# Patient Record
Sex: MALE | Race: WHITE | Employment: UNEMPLOYED | URBAN - NONMETROPOLITAN AREA
[De-identification: names, ages, dates, MRNs, and addresses within clinical notes are randomized per-mention and may not be internally consistent; named-entity substitution may affect disease eponyms.]

---

## 2023-12-27 ENCOUNTER — HOSPITAL ENCOUNTER (EMERGENCY)
Age: 48
Discharge: HOME | End: 2023-12-27

## 2023-12-27 VITALS
HEART RATE: 78 BPM | OXYGEN SATURATION: 100 % | TEMPERATURE: 98 F | RESPIRATION RATE: 21 BRPM | SYSTOLIC BLOOD PRESSURE: 117 MMHG | DIASTOLIC BLOOD PRESSURE: 74 MMHG

## 2023-12-27 DIAGNOSIS — H10.9 CONJUNCTIVITIS OF BOTH EYES, UNSPECIFIED CONJUNCTIVITIS TYPE: Primary | ICD-10-CM

## 2023-12-27 PROCEDURE — 99283 EMERGENCY DEPT VISIT LOW MDM: CPT

## 2023-12-27 RX ORDER — POLYMYXIN B SULFATE AND TRIMETHOPRIM 1; 10000 MG/ML; [USP'U]/ML
1 SOLUTION OPHTHALMIC EVERY 4 HOURS
Qty: 3 ML | Refills: 0 | Status: SHIPPED | OUTPATIENT
Start: 2023-12-27 | End: 2024-01-06

## 2023-12-27 RX ORDER — POLYMYXIN B SULFATE AND TRIMETHOPRIM 1; 10000 MG/ML; [USP'U]/ML
1 SOLUTION OPHTHALMIC
Status: DISCONTINUED | OUTPATIENT
Start: 2023-12-27 | End: 2023-12-27 | Stop reason: HOSPADM

## 2023-12-27 RX ORDER — PROPARACAINE HYDROCHLORIDE 5 MG/ML
1 SOLUTION/ DROPS OPHTHALMIC ONCE
Status: DISCONTINUED | OUTPATIENT
Start: 2023-12-27 | End: 2023-12-27 | Stop reason: HOSPADM

## 2023-12-27 NOTE — ED TRIAGE NOTES
Pt arrives to ED from home with c/o left eye pain, possible hemorrhage   Pt states he went to [facility] earlier today who told him he needed to come to ED to be seen  Pt states it started 12/24, with what he thought was pink eye

## 2024-03-13 ENCOUNTER — APPOINTMENT (OUTPATIENT)
Dept: CT IMAGING | Age: 49
DRG: 291 | End: 2024-03-13
Payer: MEDICAID

## 2024-03-13 ENCOUNTER — HOSPITAL ENCOUNTER (INPATIENT)
Age: 49
LOS: 4 days | Discharge: HOME | DRG: 291 | End: 2024-03-17
Attending: EMERGENCY MEDICINE | Admitting: HOSPITALIST
Payer: MEDICAID

## 2024-03-13 ENCOUNTER — APPOINTMENT (OUTPATIENT)
Dept: GENERAL RADIOLOGY | Age: 49
DRG: 291 | End: 2024-03-13
Payer: MEDICAID

## 2024-03-13 DIAGNOSIS — I50.811 ACUTE RIGHT-SIDED HEART FAILURE (HCC): Primary | ICD-10-CM

## 2024-03-13 DIAGNOSIS — R60.0 EDEMA OF RIGHT LOWER LEG: ICD-10-CM

## 2024-03-13 DIAGNOSIS — I26.09 ACUTE PULMONARY EMBOLISM WITH ACUTE COR PULMONALE, UNSPECIFIED PULMONARY EMBOLISM TYPE (HCC): ICD-10-CM

## 2024-03-13 PROBLEM — I50.9 ACUTE ON CHRONIC CONGESTIVE HEART FAILURE (HCC): Status: ACTIVE | Noted: 2024-03-13

## 2024-03-13 PROBLEM — R60.1 ANASARCA: Status: ACTIVE | Noted: 2024-03-13

## 2024-03-13 LAB
ALBUMIN SERPL BCG-MCNC: 4.2 G/DL (ref 3.5–5.1)
ALP SERPL-CCNC: 72 U/L (ref 38–126)
ALT SERPL W/O P-5'-P-CCNC: 22 U/L (ref 11–66)
ANION GAP SERPL CALC-SCNC: 10 MEQ/L (ref 8–16)
APTT PPP: 33.3 SECONDS (ref 22–38)
ARTERIAL PATENCY WRIST A: POSITIVE
AST SERPL-CCNC: 34 U/L (ref 5–40)
BACTERIA: NORMAL
BASE EXCESS BLDA CALC-SCNC: 8.2 MMOL/L (ref -2.5–2.5)
BDY SITE: ABNORMAL
BILIRUB SERPL-MCNC: 1 MG/DL (ref 0.3–1.2)
BILIRUB UR QL STRIP: NEGATIVE
BUN SERPL-MCNC: 10 MG/DL (ref 7–22)
CALCIUM SERPL-MCNC: 9.5 MG/DL (ref 8.5–10.5)
CASTS #/AREA URNS LPF: NORMAL /LPF
CASTS #/AREA URNS LPF: NORMAL /LPF
CHARACTER UR: CLEAR
CHARCOAL URNS QL MICRO: NORMAL
CHLORIDE SERPL-SCNC: 90 MEQ/L (ref 98–111)
CO2 SERPL-SCNC: 30 MEQ/L (ref 23–33)
COLLECTED BY:: ABNORMAL
COLOR UR: YELLOW
CREAT SERPL-MCNC: 0.7 MG/DL (ref 0.4–1.2)
CRYSTALS URNS QL MICRO: NORMAL
DEPRECATED RDW RBC AUTO: 51.2 FL (ref 35–45)
DEVICE: ABNORMAL
EKG ATRIAL RATE: 81 BPM
EKG P AXIS: 81 DEGREES
EKG P-R INTERVAL: 124 MS
EKG Q-T INTERVAL: 364 MS
EKG QRS DURATION: 76 MS
EKG QTC CALCULATION (BAZETT): 422 MS
EKG R AXIS: 169 DEGREES
EKG T AXIS: 68 DEGREES
EKG VENTRICULAR RATE: 81 BPM
EPITHELIAL CELLS, UA: NORMAL /HPF
ERYTHROCYTE [DISTWIDTH] IN BLOOD BY AUTOMATED COUNT: 14.1 % (ref 11.5–14.5)
GFR SERPL CREATININE-BSD FRML MDRD: > 60 ML/MIN/1.73M2
GLUCOSE SERPL-MCNC: 75 MG/DL (ref 70–108)
GLUCOSE UR QL STRIP.AUTO: NEGATIVE MG/DL
HCO3 BLDA-SCNC: 36 MMOL/L (ref 23–28)
HCT VFR BLD AUTO: 58.1 % (ref 42–52)
HEPARIN UNFRACTIONATED: < 0.04 U/ML (ref 0.3–0.7)
HGB BLD-MCNC: 19.2 GM/DL (ref 14–18)
HGB UR QL STRIP.AUTO: NEGATIVE
INR PPP: 0.88 (ref 0.85–1.13)
KETONES UR QL STRIP.AUTO: NEGATIVE
LEUKOCYTE ESTERASE UR QL STRIP.AUTO: NEGATIVE
MCH RBC QN AUTO: 32.3 PG (ref 26–33)
MCHC RBC AUTO-ENTMCNC: 33 GM/DL (ref 32.2–35.5)
MCV RBC AUTO: 97.6 FL (ref 80–94)
NITRITE UR QL STRIP.AUTO: NEGATIVE
NT-PROBNP SERPL IA-MCNC: 7818 PG/ML (ref 0–124)
OSMOLALITY SERPL CALC.SUM OF ELEC: 258.5 MOSMOL/KG (ref 275–300)
OSMOLALITY UR: 216 MOSMOL/KG (ref 250–750)
PCO2 BLDA: 55 MMHG (ref 35–45)
PH BLDA: 7.42 [PH] (ref 7.35–7.45)
PH UR STRIP.AUTO: 7 [PH] (ref 5–9)
PLATELET # BLD AUTO: 279 THOU/MM3 (ref 130–400)
PMV BLD AUTO: 9.1 FL (ref 9.4–12.4)
PO2 BLDA: 50 MMHG (ref 71–104)
POTASSIUM SERPL-SCNC: 5 MEQ/L (ref 3.5–5.2)
PROT SERPL-MCNC: 6.3 G/DL (ref 6.1–8)
PROT UR STRIP.AUTO-MCNC: NEGATIVE MG/DL
RBC # BLD AUTO: 5.95 MILL/MM3 (ref 4.7–6.1)
RBC #/AREA URNS HPF: NORMAL /HPF
RENAL EPI CELLS #/AREA URNS HPF: NORMAL /[HPF]
SAO2 % BLDA: 85 %
SODIUM SERPL-SCNC: 130 MEQ/L (ref 135–145)
SODIUM UR-SCNC: 90 MEQ/L
SPECIFIC GRAVITY UA: 1 (ref 1–1.03)
T4 FREE SERPL-MCNC: 1.31 NG/DL (ref 0.93–1.68)
TROPONIN, HIGH SENSITIVITY: 22 NG/L (ref 0–12)
TSH SERPL DL<=0.005 MIU/L-ACNC: 6.2 UIU/ML (ref 0.4–4.2)
UROBILINOGEN, URINE: 0.2 EU/DL (ref 0–1)
WBC # BLD AUTO: 8 THOU/MM3 (ref 4.8–10.8)
WBC #/AREA URNS HPF: NORMAL /HPF
YEAST LIKE FUNGI URNS QL MICRO: NORMAL

## 2024-03-13 PROCEDURE — 82803 BLOOD GASES ANY COMBINATION: CPT

## 2024-03-13 PROCEDURE — 94640 AIRWAY INHALATION TREATMENT: CPT

## 2024-03-13 PROCEDURE — 84484 ASSAY OF TROPONIN QUANT: CPT

## 2024-03-13 PROCEDURE — 36415 COLL VENOUS BLD VENIPUNCTURE: CPT

## 2024-03-13 PROCEDURE — 85610 PROTHROMBIN TIME: CPT

## 2024-03-13 PROCEDURE — 6360000004 HC RX CONTRAST MEDICATION: Performed by: EMERGENCY MEDICINE

## 2024-03-13 PROCEDURE — 74177 CT ABD & PELVIS W/CONTRAST: CPT

## 2024-03-13 PROCEDURE — 71275 CT ANGIOGRAPHY CHEST: CPT

## 2024-03-13 PROCEDURE — 80053 COMPREHEN METABOLIC PANEL: CPT

## 2024-03-13 PROCEDURE — 6360000002 HC RX W HCPCS

## 2024-03-13 PROCEDURE — 85027 COMPLETE CBC AUTOMATED: CPT

## 2024-03-13 PROCEDURE — 84439 ASSAY OF FREE THYROXINE: CPT

## 2024-03-13 PROCEDURE — 85730 THROMBOPLASTIN TIME PARTIAL: CPT

## 2024-03-13 PROCEDURE — 83880 ASSAY OF NATRIURETIC PEPTIDE: CPT

## 2024-03-13 PROCEDURE — 2580000003 HC RX 258: Performed by: STUDENT IN AN ORGANIZED HEALTH CARE EDUCATION/TRAINING PROGRAM

## 2024-03-13 PROCEDURE — 6370000000 HC RX 637 (ALT 250 FOR IP)

## 2024-03-13 PROCEDURE — 99285 EMERGENCY DEPT VISIT HI MDM: CPT

## 2024-03-13 PROCEDURE — 93010 ELECTROCARDIOGRAM REPORT: CPT | Performed by: INTERNAL MEDICINE

## 2024-03-13 PROCEDURE — 2580000003 HC RX 258

## 2024-03-13 PROCEDURE — 36600 WITHDRAWAL OF ARTERIAL BLOOD: CPT

## 2024-03-13 PROCEDURE — 99223 1ST HOSP IP/OBS HIGH 75: CPT | Performed by: HOSPITALIST

## 2024-03-13 PROCEDURE — 93005 ELECTROCARDIOGRAM TRACING: CPT | Performed by: EMERGENCY MEDICINE

## 2024-03-13 PROCEDURE — 6360000002 HC RX W HCPCS: Performed by: EMERGENCY MEDICINE

## 2024-03-13 PROCEDURE — 83935 ASSAY OF URINE OSMOLALITY: CPT

## 2024-03-13 PROCEDURE — 96374 THER/PROPH/DIAG INJ IV PUSH: CPT

## 2024-03-13 PROCEDURE — 81001 URINALYSIS AUTO W/SCOPE: CPT

## 2024-03-13 PROCEDURE — 84300 ASSAY OF URINE SODIUM: CPT

## 2024-03-13 PROCEDURE — 96375 TX/PRO/DX INJ NEW DRUG ADDON: CPT

## 2024-03-13 PROCEDURE — 71045 X-RAY EXAM CHEST 1 VIEW: CPT

## 2024-03-13 PROCEDURE — 84443 ASSAY THYROID STIM HORMONE: CPT

## 2024-03-13 PROCEDURE — 85520 HEPARIN ASSAY: CPT

## 2024-03-13 PROCEDURE — 1200000003 HC TELEMETRY R&B

## 2024-03-13 RX ORDER — HEPARIN SODIUM 1000 [USP'U]/ML
30 INJECTION, SOLUTION INTRAVENOUS; SUBCUTANEOUS PRN
Status: DISCONTINUED | OUTPATIENT
Start: 2024-03-13 | End: 2024-03-13 | Stop reason: DRUGHIGH

## 2024-03-13 RX ORDER — POTASSIUM CHLORIDE 7.45 MG/ML
10 INJECTION INTRAVENOUS PRN
Status: DISCONTINUED | OUTPATIENT
Start: 2024-03-13 | End: 2024-03-17 | Stop reason: HOSPADM

## 2024-03-13 RX ORDER — ONDANSETRON 4 MG/1
4 TABLET, ORALLY DISINTEGRATING ORAL EVERY 8 HOURS PRN
Status: DISCONTINUED | OUTPATIENT
Start: 2024-03-13 | End: 2024-03-17 | Stop reason: HOSPADM

## 2024-03-13 RX ORDER — HEPARIN SODIUM 10000 [USP'U]/100ML
5-30 INJECTION, SOLUTION INTRAVENOUS CONTINUOUS
Status: DISCONTINUED | OUTPATIENT
Start: 2024-03-13 | End: 2024-03-13 | Stop reason: DRUGHIGH

## 2024-03-13 RX ORDER — SODIUM CHLORIDE 0.9 % (FLUSH) 0.9 %
10 SYRINGE (ML) INJECTION PRN
Status: DISCONTINUED | OUTPATIENT
Start: 2024-03-13 | End: 2024-03-17 | Stop reason: HOSPADM

## 2024-03-13 RX ORDER — ONDANSETRON 2 MG/ML
4 INJECTION INTRAMUSCULAR; INTRAVENOUS EVERY 6 HOURS PRN
Status: DISCONTINUED | OUTPATIENT
Start: 2024-03-13 | End: 2024-03-17 | Stop reason: HOSPADM

## 2024-03-13 RX ORDER — ACETAMINOPHEN 325 MG/1
650 TABLET ORAL EVERY 6 HOURS PRN
Status: DISCONTINUED | OUTPATIENT
Start: 2024-03-13 | End: 2024-03-17 | Stop reason: HOSPADM

## 2024-03-13 RX ORDER — HEPARIN SODIUM 1000 [USP'U]/ML
40 INJECTION, SOLUTION INTRAVENOUS; SUBCUTANEOUS PRN
Status: DISCONTINUED | OUTPATIENT
Start: 2024-03-13 | End: 2024-03-13 | Stop reason: ALTCHOICE

## 2024-03-13 RX ORDER — POLYETHYLENE GLYCOL 3350 17 G/17G
17 POWDER, FOR SOLUTION ORAL DAILY PRN
Status: DISCONTINUED | OUTPATIENT
Start: 2024-03-13 | End: 2024-03-17 | Stop reason: HOSPADM

## 2024-03-13 RX ORDER — DEXTROSE MONOHYDRATE 100 MG/ML
INJECTION, SOLUTION INTRAVENOUS CONTINUOUS PRN
Status: DISCONTINUED | OUTPATIENT
Start: 2024-03-13 | End: 2024-03-17 | Stop reason: HOSPADM

## 2024-03-13 RX ORDER — CALCIUM GLUCONATE 20 MG/ML
2000 INJECTION, SOLUTION INTRAVENOUS PRN
Status: DISCONTINUED | OUTPATIENT
Start: 2024-03-13 | End: 2024-03-17 | Stop reason: HOSPADM

## 2024-03-13 RX ORDER — NICOTINE 21 MG/24HR
1 PATCH, TRANSDERMAL 24 HOURS TRANSDERMAL DAILY
Status: DISCONTINUED | OUTPATIENT
Start: 2024-03-13 | End: 2024-03-17 | Stop reason: HOSPADM

## 2024-03-13 RX ORDER — MAGNESIUM SULFATE IN WATER 40 MG/ML
2000 INJECTION, SOLUTION INTRAVENOUS PRN
Status: DISCONTINUED | OUTPATIENT
Start: 2024-03-13 | End: 2024-03-17 | Stop reason: HOSPADM

## 2024-03-13 RX ORDER — SODIUM CHLORIDE 9 MG/ML
INJECTION, SOLUTION INTRAVENOUS PRN
Status: DISCONTINUED | OUTPATIENT
Start: 2024-03-13 | End: 2024-03-17 | Stop reason: HOSPADM

## 2024-03-13 RX ORDER — BUMETANIDE 0.25 MG/ML
1 INJECTION INTRAMUSCULAR; INTRAVENOUS 2 TIMES DAILY
Status: DISCONTINUED | OUTPATIENT
Start: 2024-03-14 | End: 2024-03-17 | Stop reason: HOSPADM

## 2024-03-13 RX ORDER — HEPARIN SODIUM 1000 [USP'U]/ML
80 INJECTION, SOLUTION INTRAVENOUS; SUBCUTANEOUS PRN
Status: DISCONTINUED | OUTPATIENT
Start: 2024-03-13 | End: 2024-03-13 | Stop reason: ALTCHOICE

## 2024-03-13 RX ORDER — ACETAMINOPHEN 650 MG/1
650 SUPPOSITORY RECTAL EVERY 6 HOURS PRN
Status: DISCONTINUED | OUTPATIENT
Start: 2024-03-13 | End: 2024-03-17 | Stop reason: HOSPADM

## 2024-03-13 RX ORDER — POTASSIUM CHLORIDE 20 MEQ/1
40 TABLET, EXTENDED RELEASE ORAL PRN
Status: DISCONTINUED | OUTPATIENT
Start: 2024-03-13 | End: 2024-03-17 | Stop reason: HOSPADM

## 2024-03-13 RX ORDER — HEPARIN SODIUM 1000 [USP'U]/ML
60 INJECTION, SOLUTION INTRAVENOUS; SUBCUTANEOUS PRN
Status: DISCONTINUED | OUTPATIENT
Start: 2024-03-13 | End: 2024-03-13 | Stop reason: DRUGHIGH

## 2024-03-13 RX ORDER — HEPARIN SODIUM 1000 [USP'U]/ML
60 INJECTION, SOLUTION INTRAVENOUS; SUBCUTANEOUS ONCE
Status: DISCONTINUED | OUTPATIENT
Start: 2024-03-13 | End: 2024-03-13 | Stop reason: DRUGHIGH

## 2024-03-13 RX ORDER — IPRATROPIUM BROMIDE AND ALBUTEROL SULFATE 2.5; .5 MG/3ML; MG/3ML
1 SOLUTION RESPIRATORY (INHALATION) ONCE
Status: COMPLETED | OUTPATIENT
Start: 2024-03-13 | End: 2024-03-13

## 2024-03-13 RX ORDER — FUROSEMIDE 10 MG/ML
40 INJECTION INTRAMUSCULAR; INTRAVENOUS ONCE
Status: COMPLETED | OUTPATIENT
Start: 2024-03-13 | End: 2024-03-13

## 2024-03-13 RX ORDER — HEPARIN SODIUM 10000 [USP'U]/100ML
5-30 INJECTION, SOLUTION INTRAVENOUS CONTINUOUS
Status: DISCONTINUED | OUTPATIENT
Start: 2024-03-13 | End: 2024-03-13

## 2024-03-13 RX ORDER — ALBUTEROL SULFATE 2.5 MG/3ML
2.5 SOLUTION RESPIRATORY (INHALATION) EVERY 6 HOURS PRN
Status: DISCONTINUED | OUTPATIENT
Start: 2024-03-13 | End: 2024-03-14

## 2024-03-13 RX ORDER — SODIUM CHLORIDE 0.9 % (FLUSH) 0.9 %
5-40 SYRINGE (ML) INJECTION EVERY 12 HOURS SCHEDULED
Status: DISCONTINUED | OUTPATIENT
Start: 2024-03-13 | End: 2024-03-17 | Stop reason: HOSPADM

## 2024-03-13 RX ORDER — HEPARIN SODIUM 1000 [USP'U]/ML
80 INJECTION, SOLUTION INTRAVENOUS; SUBCUTANEOUS ONCE
Status: COMPLETED | OUTPATIENT
Start: 2024-03-13 | End: 2024-03-13

## 2024-03-13 RX ORDER — IBUPROFEN 600 MG/1
1 TABLET ORAL PRN
Status: DISCONTINUED | OUTPATIENT
Start: 2024-03-13 | End: 2024-03-17 | Stop reason: HOSPADM

## 2024-03-13 RX ORDER — ASPIRIN 81 MG/1
81 TABLET, CHEWABLE ORAL DAILY
COMMUNITY

## 2024-03-13 RX ADMIN — IPRATROPIUM BROMIDE AND ALBUTEROL SULFATE 1 DOSE: .5; 3 SOLUTION RESPIRATORY (INHALATION) at 12:36

## 2024-03-13 RX ADMIN — HEPARIN SODIUM 4720 UNITS: 1000 INJECTION INTRAVENOUS; SUBCUTANEOUS at 13:39

## 2024-03-13 RX ADMIN — IOPAMIDOL 80 ML: 755 INJECTION, SOLUTION INTRAVENOUS at 12:11

## 2024-03-13 RX ADMIN — SODIUM CHLORIDE, PRESERVATIVE FREE 10 ML: 5 INJECTION INTRAVENOUS at 22:58

## 2024-03-13 RX ADMIN — WATER 125 MG: 1 INJECTION INTRAMUSCULAR; INTRAVENOUS; SUBCUTANEOUS at 12:27

## 2024-03-13 RX ADMIN — HEPARIN SODIUM 18 UNITS/KG/HR: 10000 INJECTION, SOLUTION INTRAVENOUS at 13:39

## 2024-03-13 RX ADMIN — FUROSEMIDE 40 MG: 10 INJECTION, SOLUTION INTRAMUSCULAR; INTRAVENOUS at 11:18

## 2024-03-13 ASSESSMENT — PAIN DESCRIPTION - DESCRIPTORS: DESCRIPTORS: TIGHTNESS

## 2024-03-13 ASSESSMENT — PAIN SCALES - GENERAL
PAINLEVEL_OUTOF10: 3

## 2024-03-13 ASSESSMENT — PAIN DESCRIPTION - LOCATION: LOCATION: ABDOMEN

## 2024-03-13 ASSESSMENT — PAIN - FUNCTIONAL ASSESSMENT
PAIN_FUNCTIONAL_ASSESSMENT: NONE - DENIES PAIN
PAIN_FUNCTIONAL_ASSESSMENT: 0-10
PAIN_FUNCTIONAL_ASSESSMENT: NONE - DENIES PAIN

## 2024-03-13 ASSESSMENT — PAIN DESCRIPTION - PAIN TYPE: TYPE: ACUTE PAIN

## 2024-03-13 NOTE — ED PROVIDER NOTES
PATIENT NAME: Alex Martell  MRN: [id number]  : 1975  MCRAE: 3/13/2024    I performed a history and physical examination of the patient and discussed management with the Resident. I reviewed the Resident's note and agree with the documented findings and plan of care. Any areas of disagreement are noted on the chart. I was personally present for the key portions of any procedures and have documented in the chart those procedures where I was not present during the key portions. I have reviewed the emergency nurses triage note and agree with the chief complaint, past medical history, past surgical history, allergies, medications, social and family history as documented unless otherwise noted below.    MEDICAL DEDISION MAKING (MDM)     Alex Martell is a 48 y.o. male who presents to Emergency Department with Leg Swelling, Abdominal Pain, and Shortness of Breath     B/L LE edema since 3/9/2024. He has increasing MCRAE with wet cough. No orthopnea.   PMH of asthma, pneumonia and COPD.     Exam: AVSS. Nontoxic appearing. Heart: RRR, S1 and S2. Lungs with wheezing and crackles. Soft abdomen w/o tenderness. Neurologically intact. No skin rashes. 3+ pitting edema.   POCUS echo shows right heart strain with flakes in IVC.    A subsequent CTA chest shows right heart strain and a small left lower lobe PE. Heparin drip was started. Admission is warranted. Discussed with and admitted by hospital service. Hemodynamically stable in ED    CRITICAL CARE: There was a high probability of clinically significant/life threatening deterioration in this patient's condition of acute PE with R heart strain which required my urgent intervention.  I personally spent at least 30 minutes of time attending to this patient's critical care needs separate from teaching or billable procedures. This time includes bedside evaluation and management, review of labs and imaging, review of the chart for written updates and recommendations, 
auscultation. D/t c/f new onset CHF exacerbation, bedside US performed. No clear evidence of CHF on US, EF normal; right heart strain appreciated, with particulate matter seen in dilated IVC. No scrotal swelling or evidence of DVT appreciated, findings concerning for liver cirrhosis seen. BNP elevated at 7818. CTA chest with PE in subsegmental pulmonary artery, noted that contrast accumulated in right heart raising suspicion for right-sided heart failure as well. CT A/P w/ anasarca. Hospitalist contacted for admission for further evaluation and management.     FINAL IMPRESSION      1. Acute right-sided heart failure (HCC)    2. Acute pulmonary embolism with acute cor pulmonale, unspecified pulmonary embolism type (HCC)          DISPOSITION/PLAN   Condition: condition: stable  Dispo: Admit to med/surg floor  DISPOSITION Decision To Admit 03/13/2024 12:40:36 PM      Outpatient follow up (If applicable):  No follow-up provider specified.  The results of pertinent diagnostic studies and exam findings were discussed with the patient/surrogate. The patient’s provisional diagnosis and plan of care were discussed with the patient and present family who expressed understanding. Medications were reviewed and indications and risks of medications were discussed with the patient/family present. Strict return precautions and follow up instructions were discussed with the patient prior to discharge, with which the patient agrees.          DISCHARGE PRESCRIPTIONS: (None if blank)  New Prescriptions    No medications on file         This transcription was electronically signed. Parts of this transcriptions may have been dictated by use of voice recognition software and electronically transcribed, and parts may have been transcribed with the assistance of an ED scribe. The transcription may contain errors not detected in proofreading.  Please refer to my supervising physician's documentation if my documentation

## 2024-03-13 NOTE — H&P
Multiplanar reformats are provided. PE protocol was utilized. 1  mm and 3  mm axial images were provided through the chest after the administration of IV contrast. A non-contrast  localizer was obtained. 10 mm MIP reconstructions of the chest performed in coronal and sagittal planes All CT scans at this facility use dose modulation, iterative reconstruction, and/or weight based dosing when appropriate to reduce the radiation dose to as low as reasonably achievable. CONTRAST: 80  cc of Isovue-370  intravenously FINDINGS: There is adequate opacification of the main pulmonary artery and its branches.. A filling defect is seen in a subsegmental pulmonary artery of the left lower lobe right heart strain is suspected.. No focal pulmonary consolidation. No large pulmonary mass. Central airway is patent. Lung cyst is noted. Right basal subsegmental atelectasis. No pleural effusions.  Mild pericardial effusion. The heart is not enlarged. Ascending thoracic aorta is not dilated.  The main pulmonary artery is not dilated. Mild aortic arch calcifications. No significantly enlarged mediastinal or  axillary lymph node. The thyroid is not enlarged. No chest wall mass. The abdomen is described on a concurrent CT of the abdomen and pelvis. Bones: Old fracture deformity of the sternum..     1. A filling defect is seen in a subsegmental pulmonary artery of the left lower lobe reflecting acute pulmonary embolus. Right heart strain is suspected. Echocardiogram for confirmation is recommended. 2. Mild pericardial effusion. 3. Right basal atelectasis. **This report has been created using voice recognition software.  It may contain minor errors which are inherent in voice recognition technology.** Final report electronically signed by Dr Georgiana Villalpando on 3/13/2024 12:47 PM    XR CHEST PORTABLE    Result Date: 3/13/2024  PROCEDURE: XR CHEST PORTABLE CLINICAL INFORMATION: crackles, SOB, leg swelling COMPARISON: No prior study. TECHNIQUE:

## 2024-03-13 NOTE — ED TRIAGE NOTES
Pt to ED with c/o of dyspnea and swelling of legs that started 3/10/24. Pt has +3 pitting edema in bilateral lower extremities. Pt has wet cough. Denies cardiac history. Reports history of asthma, COPD and multiple episodes of pneumonia. Pt has prescribed breathing treatments but has not filled prescriptions. Pt states he smokes 2 packs a day. EKG completed on arrival.

## 2024-03-13 NOTE — ED NOTES
ED to inpatient nurses report      Chief Complaint:  Chief Complaint   Patient presents with    Leg Swelling    Abdominal Pain    Shortness of Breath     Present to ED from: HOME    MOA:     LOC: alert and orientated to name, place, date  Mobility: Independent  Oxygen Baseline: ROOM AIR    Current needs required: NONE     Code Status:   No Order    What abnormal results were found and what did you give/do to treat them? FLUID RETENTION, PE  Any procedures or intervention occur? LASIX, HEPARIN     Mental Status:  Level of Consciousness: Alert (0)    Psych Assessment:        Vitals:  Patient Vitals for the past 24 hrs:   BP Temp Temp src Pulse Resp SpO2 Height Weight   03/13/24 1230 (!) 117/95 -- -- 90 18 95 % -- --   03/13/24 1119 (!) 125/99 -- -- 84 18 97 % -- --   03/13/24 1118 (!) 125/99 -- -- -- -- -- -- --   03/13/24 1043 (!) 134/106 97.7 °F (36.5 °C) -- 77 20 99 % 1.575 m (5' 2\") 59 kg (130 lb)   03/13/24 1035 (!) 120/91 98.2 °F (36.8 °C) Oral 85 24 90 % -- --        LDAs:   Peripheral IV 03/13/24 Right Arm (Active)   Site Assessment Clean, dry & intact 03/13/24 1230   Line Status Flushed 03/13/24 1230   Line Care Cap changed 03/13/24 1056   Phlebitis Assessment No symptoms 03/13/24 1230   Infiltration Assessment 0 03/13/24 1230       Ambulatory Status:  No data recorded    Diagnosis:  DISPOSITION Admitted 03/13/2024 01:30:31 PM   Final diagnoses:   Acute right-sided heart failure (HCC)   Acute pulmonary embolism with acute cor pulmonale, unspecified pulmonary embolism type (HCC)        Consults:  None     Pain Score:  Pain Assessment  Pain Assessment: None - Denies Pain  Pain Level: 3  Patient's Stated Pain Goal: 1  Pain Location: Abdomen  Pain Descriptors: Tightness  Pain Type: Acute pain    C-SSRS:   Risk of Suicide: No Risk    Sepsis Screening:  Sepsis Risk Score: 0.67    Harrell Fall Risk:       Swallow Screening        Preferred Language:   English      ALLERGIES     Patient has no known

## 2024-03-13 NOTE — ED NOTES
Bedside report completed at this time. Pt in bed talking with SO at bedside. Updated on plan of care. Voiced no needs. Call light in reach.

## 2024-03-14 ENCOUNTER — APPOINTMENT (OUTPATIENT)
Age: 49
DRG: 291 | End: 2024-03-14
Attending: STUDENT IN AN ORGANIZED HEALTH CARE EDUCATION/TRAINING PROGRAM
Payer: MEDICAID

## 2024-03-14 ENCOUNTER — APPOINTMENT (OUTPATIENT)
Dept: INTERVENTIONAL RADIOLOGY/VASCULAR | Age: 49
DRG: 291 | End: 2024-03-14
Payer: MEDICAID

## 2024-03-14 LAB
ANION GAP SERPL CALC-SCNC: 10 MEQ/L (ref 8–16)
BUN SERPL-MCNC: 12 MG/DL (ref 7–22)
CALCIUM SERPL-MCNC: 9.2 MG/DL (ref 8.5–10.5)
CHLORIDE SERPL-SCNC: 93 MEQ/L (ref 98–111)
CO2 SERPL-SCNC: 33 MEQ/L (ref 23–33)
CREAT SERPL-MCNC: 0.8 MG/DL (ref 0.4–1.2)
DEPRECATED RDW RBC AUTO: 50.9 FL (ref 35–45)
ECHO AO ASC DIAM: 3.5 CM
ECHO AO ASCENDING AORTA INDEX: 2.26 CM/M2
ECHO AO SINUS VALSALVA DIAM: 3.4 CM
ECHO AO SINUS VALSALVA INDEX: 2.19 CM/M2
ECHO AV CUSP MM: 2 CM
ECHO AV PEAK GRADIENT: 4 MMHG
ECHO AV PEAK VELOCITY: 1.1 M/S
ECHO AV VELOCITY RATIO: 0.73
ECHO BSA: 1.61 M2
ECHO EST RA PRESSURE: 10 MMHG
ECHO IVC PROX: 2.3 CM
ECHO LA AREA 4C: 12.4 CM2
ECHO LA DIAMETER INDEX: 2 CM/M2
ECHO LA DIAMETER: 3.1 CM
ECHO LA MAJOR AXIS: 4.8 CM
ECHO LA VOL MOD A4C: 26 ML (ref 18–58)
ECHO LA VOLUME INDEX MOD A4C: 17 ML/M2 (ref 16–34)
ECHO LV E' LATERAL VELOCITY: 7 CM/S
ECHO LV E' SEPTAL VELOCITY: 8 CM/S
ECHO LV FRACTIONAL SHORTENING: 34 % (ref 28–44)
ECHO LV INTERNAL DIMENSION DIASTOLE INDEX: 2.84 CM/M2
ECHO LV INTERNAL DIMENSION DIASTOLIC: 4.4 CM (ref 4.2–5.9)
ECHO LV INTERNAL DIMENSION SYSTOLIC INDEX: 1.87 CM/M2
ECHO LV INTERNAL DIMENSION SYSTOLIC: 2.9 CM
ECHO LV ISOVOLUMETRIC RELAXATION TIME (IVRT): 106 MS
ECHO LV IVSD: 1 CM (ref 0.6–1)
ECHO LV MASS 2D: 147.8 G (ref 88–224)
ECHO LV MASS INDEX 2D: 95.4 G/M2 (ref 49–115)
ECHO LV POSTERIOR WALL DIASTOLIC: 1 CM (ref 0.6–1)
ECHO LV RELATIVE WALL THICKNESS RATIO: 0.45
ECHO LVOT PEAK GRADIENT: 3 MMHG
ECHO LVOT PEAK VELOCITY: 0.8 M/S
ECHO MV A VELOCITY: 0.49 M/S
ECHO MV E DECELERATION TIME (DT): 233 MS
ECHO MV E VELOCITY: 0.37 M/S
ECHO MV E/A RATIO: 0.76
ECHO MV E/E' LATERAL: 5.29
ECHO MV E/E' RATIO (AVERAGED): 4.96
ECHO PV MAX VELOCITY: 0.8 M/S
ECHO PV PEAK GRADIENT: 3 MMHG
ECHO RIGHT VENTRICULAR SYSTOLIC PRESSURE (RVSP): 39 MMHG
ECHO RV GLOBAL SYSTOLIC STRAIN (GLS): -11.8 %
ECHO RV INTERNAL DIMENSION: 3.6 CM
ECHO RV TAPSE: 1.3 CM (ref 1.7–?)
ECHO TV E WAVE: 0.6 M/S
ECHO TV REGURGITANT MAX VELOCITY: 2.67 M/S
ECHO TV REGURGITANT PEAK GRADIENT: 29 MMHG
ERYTHROCYTE [DISTWIDTH] IN BLOOD BY AUTOMATED COUNT: 14.4 % (ref 11.5–14.5)
GFR SERPL CREATININE-BSD FRML MDRD: > 60 ML/MIN/1.73M2
GLUCOSE SERPL-MCNC: 110 MG/DL (ref 70–108)
HCT VFR BLD AUTO: 54.2 % (ref 42–52)
HGB BLD-MCNC: 17.9 GM/DL (ref 14–18)
MCH RBC QN AUTO: 31.8 PG (ref 26–33)
MCHC RBC AUTO-ENTMCNC: 33 GM/DL (ref 32.2–35.5)
MCV RBC AUTO: 96.3 FL (ref 80–94)
OSMOLALITY SERPL CALC.SUM OF ELEC: 272.4 MOSMOL/KG (ref 275–300)
PLATELET # BLD AUTO: 297 THOU/MM3 (ref 130–400)
PMV BLD AUTO: 9.1 FL (ref 9.4–12.4)
POTASSIUM SERPL-SCNC: 4.5 MEQ/L (ref 3.5–5.2)
RBC # BLD AUTO: 5.63 MILL/MM3 (ref 4.7–6.1)
REVIEWED BY: NORMAL
SMEAR REVIEW: NORMAL
SODIUM SERPL-SCNC: 136 MEQ/L (ref 135–145)
WBC # BLD AUTO: 7.1 THOU/MM3 (ref 4.8–10.8)

## 2024-03-14 PROCEDURE — 93306 TTE W/DOPPLER COMPLETE: CPT | Performed by: INTERNAL MEDICINE

## 2024-03-14 PROCEDURE — 36415 COLL VENOUS BLD VENIPUNCTURE: CPT

## 2024-03-14 PROCEDURE — 2580000003 HC RX 258: Performed by: STUDENT IN AN ORGANIZED HEALTH CARE EDUCATION/TRAINING PROGRAM

## 2024-03-14 PROCEDURE — 99233 SBSQ HOSP IP/OBS HIGH 50: CPT | Performed by: PHYSICIAN ASSISTANT

## 2024-03-14 PROCEDURE — 94669 MECHANICAL CHEST WALL OSCILL: CPT

## 2024-03-14 PROCEDURE — 6370000000 HC RX 637 (ALT 250 FOR IP)

## 2024-03-14 PROCEDURE — 85027 COMPLETE CBC AUTOMATED: CPT

## 2024-03-14 PROCEDURE — 6360000002 HC RX W HCPCS: Performed by: PHYSICIAN ASSISTANT

## 2024-03-14 PROCEDURE — 6360000002 HC RX W HCPCS: Performed by: STUDENT IN AN ORGANIZED HEALTH CARE EDUCATION/TRAINING PROGRAM

## 2024-03-14 PROCEDURE — 94640 AIRWAY INHALATION TREATMENT: CPT

## 2024-03-14 PROCEDURE — 80048 BASIC METABOLIC PNL TOTAL CA: CPT

## 2024-03-14 PROCEDURE — 1200000003 HC TELEMETRY R&B

## 2024-03-14 PROCEDURE — 93971 EXTREMITY STUDY: CPT

## 2024-03-14 PROCEDURE — 93306 TTE W/DOPPLER COMPLETE: CPT

## 2024-03-14 RX ORDER — ALBUTEROL SULFATE 2.5 MG/3ML
2.5 SOLUTION RESPIRATORY (INHALATION) 2 TIMES DAILY
Status: DISCONTINUED | OUTPATIENT
Start: 2024-03-14 | End: 2024-03-17 | Stop reason: HOSPADM

## 2024-03-14 RX ORDER — ALBUTEROL SULFATE 2.5 MG/3ML
2.5 SOLUTION RESPIRATORY (INHALATION) EVERY 4 HOURS PRN
Status: DISCONTINUED | OUTPATIENT
Start: 2024-03-14 | End: 2024-03-17 | Stop reason: HOSPADM

## 2024-03-14 RX ORDER — ENOXAPARIN SODIUM 100 MG/ML
1 INJECTION SUBCUTANEOUS EVERY 12 HOURS
Status: DISCONTINUED | OUTPATIENT
Start: 2024-03-14 | End: 2024-03-16

## 2024-03-14 RX ADMIN — BUMETANIDE 1 MG: 0.25 INJECTION INTRAMUSCULAR; INTRAVENOUS at 20:51

## 2024-03-14 RX ADMIN — ALBUTEROL SULFATE 2.5 MG: 2.5 SOLUTION RESPIRATORY (INHALATION) at 15:30

## 2024-03-14 RX ADMIN — BUMETANIDE 1 MG: 0.25 INJECTION INTRAMUSCULAR; INTRAVENOUS at 09:35

## 2024-03-14 RX ADMIN — SODIUM CHLORIDE, PRESERVATIVE FREE 10 ML: 5 INJECTION INTRAVENOUS at 20:51

## 2024-03-14 RX ADMIN — ENOXAPARIN SODIUM 60 MG: 100 INJECTION SUBCUTANEOUS at 17:22

## 2024-03-14 RX ADMIN — ALBUTEROL SULFATE 2.5 MG: 2.5 SOLUTION RESPIRATORY (INHALATION) at 12:40

## 2024-03-14 RX ADMIN — SODIUM CHLORIDE, PRESERVATIVE FREE 10 ML: 5 INJECTION INTRAVENOUS at 09:34

## 2024-03-14 NOTE — PLAN OF CARE
Problem: Discharge Planning  Goal: Discharge to home or other facility with appropriate resources  3/14/2024 1232 by Leigha Ruano RN  Outcome: Progressing  Flowsheets (Taken 3/14/2024 0900)  Discharge to home or other facility with appropriate resources: Identify barriers to discharge with patient and caregiver  3/14/2024 0003 by Manuel Castellanos RN  Outcome: Progressing     Problem: Pain  Goal: Verbalizes/displays adequate comfort level or baseline comfort level  3/14/2024 1232 by Leigha Ruano RN  Outcome: Progressing  Flowsheets (Taken 3/14/2024 1232)  Verbalizes/displays adequate comfort level or baseline comfort level: Encourage patient to monitor pain and request assistance  3/14/2024 0003 by Manuel Castellanos RN  Outcome: Progressing     Problem: Safety - Adult  Goal: Free from fall injury  3/14/2024 1232 by Leigha Ruano RN  Outcome: Progressing  Flowsheets (Taken 3/14/2024 1232)  Free From Fall Injury: Instruct family/caregiver on patient safety  3/14/2024 0003 by Manuel Castellanos RN  Outcome: Progressing     Problem: Chronic Conditions and Co-morbidities  Goal: Patient's chronic conditions and co-morbidity symptoms are monitored and maintained or improved  Outcome: Progressing  Flowsheets (Taken 3/14/2024 0900)  Care Plan - Patient's Chronic Conditions and Co-Morbidity Symptoms are Monitored and Maintained or Improved: Monitor and assess patient's chronic conditions and comorbid symptoms for stability, deterioration, or improvement   Care plan reviewed with patient.  Patient verbalizes understanding of the plan of care and contribute to goal setting.

## 2024-03-14 NOTE — CARE COORDINATION
Case Management Assessment  Initial Evaluation    Date/Time of Evaluation: 3/14/2024 11:29 AM  Assessment Completed by: Ora Lux RN    If patient is discharged prior to next notation, then this note serves as note for discharge by case management.    Patient Name: Alex Martell                   YOB: 1975  Diagnosis: Acute right-sided heart failure (HCC) [I50.811]  Anasarca [R60.1]  Acute pulmonary embolism with acute cor pulmonale, unspecified pulmonary embolism type (HCC) [I26.09]                   Date / Time: 3/13/2024 10:35 AM  Location: [facility]/005-A     Patient Admission Status: Inpatient   Readmission Risk Low 0-14, Mod 15-19), High > 20: Readmission Risk Score: 6.7    Current PCP: No primary care provider on file.  PCP verified by CM? Yes (No PCP: list provided)    Chart Reviewed: Yes      History Provided by: Patient  Patient Orientation: Alert and Oriented    Patient Cognition: Alert    Hospitalization in the last 30 days (Readmission):  No    If yes, Readmission Assessment in CM Navigator will be completed.    Advance Directives:      Code Status: Full Code   Patient's Primary Decision Maker is: Patient Declined (Legal Next of Kin Remains as Decision Maker)      Discharge Planning:    Patient lives with: Spouse/Significant Other, Children Type of Home: House  Primary Care Giver: Self  Patient Support Systems include: Spouse/Significant Other, Children, Friends/Neighbors, Family Members   Current Financial resources: Hospital Care Assurance Program  Current community resources: None  Current services prior to admission: None            Current DME:              Type of Home Care services:  None    ADLS  Prior functional level: Independent in ADLs/IADLs  Current functional level: Independent in ADLs/IADLs    Family can provide assistance at DC: Yes  Would you like Case Management to discuss the discharge plan with any other family members/significant others, and if so, who? Yes (wife

## 2024-03-14 NOTE — ED NOTES
Pt sitting in bed eating [employer] that SO brought in for him. Updated on plan of care. Voiced no needs. Call light in reach.

## 2024-03-14 NOTE — RT PROTOCOL NOTE
RT Inhaler-Nebulizer Bronchodilator Protocol Note    There is a bronchodilator order in the chart from a provider indicating to follow the RT Bronchodilator Protocol and there is an “Initiate RT Inhaler-Nebulizer Bronchodilator Protocol” order as well (see protocol at bottom of note).    CXR Findings:  XR CHEST PORTABLE    Result Date: 3/13/2024  1. No acute cardiopulmonary finding. **This report has been created using voice recognition software.  It may contain minor errors which are inherent in voice recognition technology.** Final report electronically signed by Dr Georgiana Villalpando on 3/13/2024 11:26 AM      The findings from the last RT Protocol Assessment were as follows:   History Pulmonary Disease: Chronic pulmonary disease  Respiratory Pattern: Regular pattern and RR 12-20 bpm  Breath Sounds: Intermittent or unilateral wheezes  Cough: Strong, spontaneous, non-productive  Indication for Bronchodilator Therapy: Wheezing associated with pulm disorder  Bronchodilator Assessment Score: 6    Aerosolized bronchodilator medication orders have been revised according to the RT Inhaler-Nebulizer Bronchodilator Protocol below.    Respiratory Therapist to perform RT Therapy Protocol Assessment initially then follow the protocol.  Repeat RT Therapy Protocol Assessment PRN for score 0-3 or on second treatment, BID, and PRN for scores above 3.    No Indications - adjust the frequency to every 6 hours PRN wheezing or bronchospasm, if no treatments needed after 48 hours then discontinue using Per Protocol order mode.     If indication present, adjust the RT bronchodilator orders based on the Bronchodilator Assessment Score as indicated below.  Use Inhaler orders unless patient has one or more of the following: on home nebulizer, not able to hold breath for 10 seconds, is not alert and oriented, cannot activate and use MDI correctly, or respiratory rate 25 breaths per minute or more, then use the equivalent nebulizer order(s)

## 2024-03-14 NOTE — FLOWSHEET NOTE
Reported off to primary RN Leigha. Pt is independent alarm is not on. No concerns voiced at this time, no pain voiced, call light is within reach, bed in low position, and locked. CARLOZ Keyes

## 2024-03-15 LAB
ANION GAP SERPL CALC-SCNC: 9 MEQ/L (ref 8–16)
BUN SERPL-MCNC: 13 MG/DL (ref 7–22)
CALCIUM SERPL-MCNC: 8.9 MG/DL (ref 8.5–10.5)
CHLORIDE SERPL-SCNC: 96 MEQ/L (ref 98–111)
CO2 SERPL-SCNC: 31 MEQ/L (ref 23–33)
CREAT SERPL-MCNC: 0.8 MG/DL (ref 0.4–1.2)
DEPRECATED RDW RBC AUTO: 53.5 FL (ref 35–45)
ERYTHROCYTE [DISTWIDTH] IN BLOOD BY AUTOMATED COUNT: 14.7 % (ref 11.5–14.5)
GFR SERPL CREATININE-BSD FRML MDRD: > 60 ML/MIN/1.73M2
GLUCOSE SERPL-MCNC: 98 MG/DL (ref 70–108)
HCT VFR BLD AUTO: 50.3 % (ref 42–52)
HGB BLD-MCNC: 16.5 GM/DL (ref 14–18)
MCH RBC QN AUTO: 31.9 PG (ref 26–33)
MCHC RBC AUTO-ENTMCNC: 32.8 GM/DL (ref 32.2–35.5)
MCV RBC AUTO: 97.1 FL (ref 80–94)
PLATELET # BLD AUTO: 241 THOU/MM3 (ref 130–400)
PMV BLD AUTO: 9.2 FL (ref 9.4–12.4)
POTASSIUM SERPL-SCNC: 4.4 MEQ/L (ref 3.5–5.2)
RBC # BLD AUTO: 5.18 MILL/MM3 (ref 4.7–6.1)
SODIUM SERPL-SCNC: 136 MEQ/L (ref 135–145)
WBC # BLD AUTO: 9.1 THOU/MM3 (ref 4.8–10.8)

## 2024-03-15 PROCEDURE — 94669 MECHANICAL CHEST WALL OSCILL: CPT

## 2024-03-15 PROCEDURE — 6360000002 HC RX W HCPCS: Performed by: PHYSICIAN ASSISTANT

## 2024-03-15 PROCEDURE — 36415 COLL VENOUS BLD VENIPUNCTURE: CPT

## 2024-03-15 PROCEDURE — 2580000003 HC RX 258: Performed by: STUDENT IN AN ORGANIZED HEALTH CARE EDUCATION/TRAINING PROGRAM

## 2024-03-15 PROCEDURE — 6360000002 HC RX W HCPCS: Performed by: STUDENT IN AN ORGANIZED HEALTH CARE EDUCATION/TRAINING PROGRAM

## 2024-03-15 PROCEDURE — 85027 COMPLETE CBC AUTOMATED: CPT

## 2024-03-15 PROCEDURE — 6370000000 HC RX 637 (ALT 250 FOR IP)

## 2024-03-15 PROCEDURE — 94640 AIRWAY INHALATION TREATMENT: CPT

## 2024-03-15 PROCEDURE — 80048 BASIC METABOLIC PNL TOTAL CA: CPT

## 2024-03-15 PROCEDURE — 99223 1ST HOSP IP/OBS HIGH 75: CPT | Performed by: INTERNAL MEDICINE

## 2024-03-15 PROCEDURE — 1200000003 HC TELEMETRY R&B

## 2024-03-15 PROCEDURE — 99233 SBSQ HOSP IP/OBS HIGH 50: CPT | Performed by: PHYSICIAN ASSISTANT

## 2024-03-15 RX ADMIN — BUMETANIDE 1 MG: 0.25 INJECTION INTRAMUSCULAR; INTRAVENOUS at 08:17

## 2024-03-15 RX ADMIN — ENOXAPARIN SODIUM 60 MG: 100 INJECTION SUBCUTANEOUS at 05:16

## 2024-03-15 RX ADMIN — ENOXAPARIN SODIUM 60 MG: 100 INJECTION SUBCUTANEOUS at 17:29

## 2024-03-15 RX ADMIN — ALBUTEROL SULFATE 2.5 MG: 2.5 SOLUTION RESPIRATORY (INHALATION) at 08:11

## 2024-03-15 RX ADMIN — SODIUM CHLORIDE, PRESERVATIVE FREE 10 ML: 5 INJECTION INTRAVENOUS at 19:48

## 2024-03-15 RX ADMIN — SODIUM CHLORIDE, PRESERVATIVE FREE 10 ML: 5 INJECTION INTRAVENOUS at 08:15

## 2024-03-15 RX ADMIN — BUMETANIDE 1 MG: 0.25 INJECTION INTRAMUSCULAR; INTRAVENOUS at 19:47

## 2024-03-15 RX ADMIN — ALBUTEROL SULFATE 2.5 MG: 2.5 SOLUTION RESPIRATORY (INHALATION) at 18:12

## 2024-03-15 NOTE — CONSULTS
[facility]'s  Consult    Patient's Name/Date of Birth: Alex Martell / 1975 (48 y.o.)    Date: March 15, 2024     Referring Provider: Sami Roblero PA-C    CHIEF COMPLAINT: CHF      HPI: This is a pleasant 48 y.o. male with hx of sob,COPD, tobacco abuse, alcohol dependence, who was admitted for sob and pain.  He also had LE swelling 3/1024.  He then noted worsening sob on top of chronic dyspnea.  No pleurisy.  He has cough as well.  Dilated IVC/RA seen on POCUS.  CTA shows subsegmental PE, mild pericardial effusion, and anasarca.  He was given Lasix 40 mg IV, DuoNebs, Solumedrol.  He was also treated with Heparin gtt.  He has orthopnea as well. EF preserved, RVSP 39 mmHg.  RV is severely dilated with abnormal TAPSE. No DVT on duplex. BP and HR well controlled.  3/13/24 CTA PE: A filling defect is seen in a subsegmental pulmonary artery of the left lower lobe right heart strain is suspected.     Echo: No results found for this or any previous visit.       All labs, EKG's, diagnostic testing and images as well as cardiac cath, stress testing were reviewed during this encounter    Past Medical History:   Diagnosis Date    Asthma      Past Surgical History:   Procedure Laterality Date    HERNIA REPAIR  1976,1990,1998    3 hernia repairs    HYDROCELE EXCISION       Current Facility-Administered Medications   Medication Dose Route Frequency Provider Last Rate Last Admin    albuterol (PROVENTIL) (2.5 MG/3ML) 0.083% nebulizer solution 2.5 mg  2.5 mg Nebulization Q4H PRN Sami Roblero PA-C        albuterol (PROVENTIL) (2.5 MG/3ML) 0.083% nebulizer solution 2.5 mg  2.5 mg Nebulization BID Sami Roblero PA-C   2.5 mg at 03/15/24 0811    enoxaparin (LOVENOX) injection 60 mg  1 mg/kg SubCUTAneous Q12H Sami Roblero PA-C   60 mg at 03/15/24 0516    potassium chloride (KLOR-CON M) extended release tablet 40 mEq  40 mEq Oral PRN Tori, Socrates, DO        Or    potassium

## 2024-03-15 NOTE — CARE COORDINATION
3/15/24, 2:46 PM EDT    DISCHARGE ON GOING EVALUATION    [facility] day: 2  Location: -05/005-A Reason for admit: Acute right-sided heart failure (HCC) [I50.811]  Anasarca [R60.1]  Acute pulmonary embolism with acute cor pulmonale, unspecified pulmonary embolism type (HCC) [I26.09]   Procedure:  CTA Chest W WO: A filling defect is seen in a subsegmental pulmonary artery of the left lower lobe reflecting acute pulmonary embolus. Right heart strain is suspected. Echocardiogram for confirmation is recommended.  2. Mild pericardial effusion.  3. Right basal atelectasis.  3/13 CT A/P W:  Anasarca. Body wall edema and small ascites with small pericardial effusion.  2. A small amount of nonspecific pericholecystic fluid  3/13 echo: EF 50-55%  Barriers to Discharge: Weaned to 3L O2, 93%.IV bumex, lovenox in use. Cardio consult pending for severe RV dilation and right heart strain.   PCP: No primary care provider on file.  Readmission Risk Score: 5.8%  Patient Goals/Plan/Treatment Preferences: Home with wife and kids. Has PCP list. Meds from [facility]. May need to [employer] oxygen if unable to be weaned.

## 2024-03-15 NOTE — PLAN OF CARE
Problem: Discharge Planning  Goal: Discharge to home or other facility with appropriate resources  3/15/2024 1221 by Leigha Ruano RN  Outcome: Progressing  Flowsheets (Taken 3/15/2024 0806)  Discharge to home or other facility with appropriate resources: Identify barriers to discharge with patient and caregiver  3/15/2024 0403 by Manuel Castellanos RN  Outcome: Progressing     Problem: Pain  Goal: Verbalizes/displays adequate comfort level or baseline comfort level  3/15/2024 1221 by Leigha Ruano RN  Outcome: Progressing  Flowsheets (Taken 3/14/2024 1232)  Verbalizes/displays adequate comfort level or baseline comfort level: Encourage patient to monitor pain and request assistance  3/15/2024 0403 by Manuel Castellanos RN  Outcome: Progressing     Problem: Safety - Adult  Goal: Free from fall injury  3/15/2024 1221 by Leigha Ruano RN  Outcome: Progressing  Flowsheets (Taken 3/14/2024 1232)  Free From Fall Injury: Instruct family/caregiver on patient safety  3/15/2024 0403 by Manuel Castellanos RN  Outcome: Progressing     Problem: Chronic Conditions and Co-morbidities  Goal: Patient's chronic conditions and co-morbidity symptoms are monitored and maintained or improved  3/15/2024 1221 by Leigha Ruano RN  Outcome: Progressing  Flowsheets (Taken 3/15/2024 0806)  Care Plan - Patient's Chronic Conditions and Co-Morbidity Symptoms are Monitored and Maintained or Improved: Monitor and assess patient's chronic conditions and comorbid symptoms for stability, deterioration, or improvement  3/15/2024 0403 by Manuel Castellanos RN  Outcome: Progressing     Problem: Respiratory - Adult  Goal: Achieves optimal ventilation and oxygenation  3/15/2024 1221 by Leigha Ruano RN  Outcome: Progressing  Flowsheets (Taken 3/15/2024 0806)  Achieves optimal ventilation and oxygenation: Assess for changes in respiratory status  3/15/2024 0817 by Cheyanne Richardson RCP  Outcome: Progressing  Flowsheets (Taken 3/15/2024 0806 by

## 2024-03-15 NOTE — PLAN OF CARE
Problem: Respiratory - Adult  Goal: Achieves optimal ventilation and oxygenation  Outcome: Progressing   Continue therapy as ordered to achieve and maintain clear breath sounds and improve aeration.

## 2024-03-16 LAB
ANION GAP SERPL CALC-SCNC: 10 MEQ/L (ref 8–16)
BUN SERPL-MCNC: 16 MG/DL (ref 7–22)
CALCIUM SERPL-MCNC: 9.4 MG/DL (ref 8.5–10.5)
CHLORIDE SERPL-SCNC: 96 MEQ/L (ref 98–111)
CO2 SERPL-SCNC: 32 MEQ/L (ref 23–33)
CREAT SERPL-MCNC: 0.7 MG/DL (ref 0.4–1.2)
DEPRECATED RDW RBC AUTO: 54.5 FL (ref 35–45)
ERYTHROCYTE [DISTWIDTH] IN BLOOD BY AUTOMATED COUNT: 14.8 % (ref 11.5–14.5)
ERYTHROCYTE [SEDIMENTATION RATE] IN BLOOD BY WESTERGREN METHOD: 2 MM/HR (ref 0–10)
GFR SERPL CREATININE-BSD FRML MDRD: > 60 ML/MIN/1.73M2
GLUCOSE SERPL-MCNC: 95 MG/DL (ref 70–108)
HCT VFR BLD AUTO: 54.3 % (ref 42–52)
HGB BLD-MCNC: 17.6 GM/DL (ref 14–18)
MCH RBC QN AUTO: 31.9 PG (ref 26–33)
MCHC RBC AUTO-ENTMCNC: 32.4 GM/DL (ref 32.2–35.5)
MCV RBC AUTO: 98.4 FL (ref 80–94)
PLATELET # BLD AUTO: 251 THOU/MM3 (ref 130–400)
PMV BLD AUTO: 9.1 FL (ref 9.4–12.4)
POTASSIUM SERPL-SCNC: 4.1 MEQ/L (ref 3.5–5.2)
RBC # BLD AUTO: 5.52 MILL/MM3 (ref 4.7–6.1)
SODIUM SERPL-SCNC: 138 MEQ/L (ref 135–145)
WBC # BLD AUTO: 8.2 THOU/MM3 (ref 4.8–10.8)

## 2024-03-16 PROCEDURE — 94761 N-INVAS EAR/PLS OXIMETRY MLT: CPT

## 2024-03-16 PROCEDURE — 2580000003 HC RX 258: Performed by: STUDENT IN AN ORGANIZED HEALTH CARE EDUCATION/TRAINING PROGRAM

## 2024-03-16 PROCEDURE — 36415 COLL VENOUS BLD VENIPUNCTURE: CPT

## 2024-03-16 PROCEDURE — 6370000000 HC RX 637 (ALT 250 FOR IP): Performed by: PHYSICIAN ASSISTANT

## 2024-03-16 PROCEDURE — 99233 SBSQ HOSP IP/OBS HIGH 50: CPT | Performed by: PHYSICIAN ASSISTANT

## 2024-03-16 PROCEDURE — 80048 BASIC METABOLIC PNL TOTAL CA: CPT

## 2024-03-16 PROCEDURE — 85651 RBC SED RATE NONAUTOMATED: CPT

## 2024-03-16 PROCEDURE — 2700000000 HC OXYGEN THERAPY PER DAY

## 2024-03-16 PROCEDURE — 6370000000 HC RX 637 (ALT 250 FOR IP)

## 2024-03-16 PROCEDURE — 85027 COMPLETE CBC AUTOMATED: CPT

## 2024-03-16 PROCEDURE — 94669 MECHANICAL CHEST WALL OSCILL: CPT

## 2024-03-16 PROCEDURE — 6360000002 HC RX W HCPCS: Performed by: PHYSICIAN ASSISTANT

## 2024-03-16 PROCEDURE — 6360000002 HC RX W HCPCS: Performed by: STUDENT IN AN ORGANIZED HEALTH CARE EDUCATION/TRAINING PROGRAM

## 2024-03-16 PROCEDURE — 86038 ANTINUCLEAR ANTIBODIES: CPT

## 2024-03-16 PROCEDURE — 99255 IP/OBS CONSLTJ NEW/EST HI 80: CPT | Performed by: INTERNAL MEDICINE

## 2024-03-16 PROCEDURE — 1200000003 HC TELEMETRY R&B

## 2024-03-16 PROCEDURE — 94640 AIRWAY INHALATION TREATMENT: CPT

## 2024-03-16 PROCEDURE — 94762 N-INVAS EAR/PLS OXIMTRY CONT: CPT

## 2024-03-16 RX ORDER — LANOLIN ALCOHOL/MO/W.PET/CERES
6 CREAM (GRAM) TOPICAL NIGHTLY PRN
Status: DISCONTINUED | OUTPATIENT
Start: 2024-03-16 | End: 2024-03-17 | Stop reason: HOSPADM

## 2024-03-16 RX ADMIN — APIXABAN 10 MG: 5 TABLET, FILM COATED ORAL at 17:05

## 2024-03-16 RX ADMIN — ALBUTEROL SULFATE 2.5 MG: 2.5 SOLUTION RESPIRATORY (INHALATION) at 16:55

## 2024-03-16 RX ADMIN — ENOXAPARIN SODIUM 60 MG: 100 INJECTION SUBCUTANEOUS at 06:25

## 2024-03-16 RX ADMIN — SODIUM CHLORIDE, PRESERVATIVE FREE 10 ML: 5 INJECTION INTRAVENOUS at 19:46

## 2024-03-16 RX ADMIN — BUMETANIDE 1 MG: 0.25 INJECTION INTRAMUSCULAR; INTRAVENOUS at 08:31

## 2024-03-16 RX ADMIN — SODIUM CHLORIDE, PRESERVATIVE FREE 10 ML: 5 INJECTION INTRAVENOUS at 08:31

## 2024-03-16 RX ADMIN — ALBUTEROL SULFATE 2.5 MG: 2.5 SOLUTION RESPIRATORY (INHALATION) at 08:34

## 2024-03-16 RX ADMIN — BUMETANIDE 1 MG: 0.25 INJECTION INTRAMUSCULAR; INTRAVENOUS at 19:45

## 2024-03-16 RX ADMIN — Medication 6 MG: at 22:15

## 2024-03-16 NOTE — PLAN OF CARE
Problem: Discharge Planning  Goal: Discharge to home or other facility with appropriate resources  3/15/2024 2241 by Linda Gallego RN  Outcome: Progressing  3/15/2024 1221 by Leigha Ruano RN  Outcome: Progressing  Flowsheets (Taken 3/15/2024 0806)  Discharge to home or other facility with appropriate resources: Identify barriers to discharge with patient and caregiver     Problem: Pain  Goal: Verbalizes/displays adequate comfort level or baseline comfort level  3/15/2024 2241 by Linda Gallego RN  Outcome: Progressing  3/15/2024 1221 by Leigha Ruano RN  Outcome: Progressing  Flowsheets (Taken 3/14/2024 1232)  Verbalizes/displays adequate comfort level or baseline comfort level: Encourage patient to monitor pain and request assistance     Problem: Safety - Adult  Goal: Free from fall injury  3/15/2024 2242 by Linda Gallego RN  Outcome: Progressing  3/15/2024 2241 by Linda Gallego RN  Outcome: Progressing  3/15/2024 1221 by Leigha Ruano RN  Outcome: Progressing  Flowsheets (Taken 3/14/2024 1232)  Free From Fall Injury: Instruct family/caregiver on patient safety     Problem: Chronic Conditions and Co-morbidities  Goal: Patient's chronic conditions and co-morbidity symptoms are monitored and maintained or improved  3/15/2024 2242 by Linda Gallego RN  Outcome: Progressing  3/15/2024 2241 by Linda Gallego RN  Outcome: Progressing  3/15/2024 1221 by Leigha Ruano RN  Outcome: Progressing  Flowsheets (Taken 3/15/2024 0806)  Care Plan - Patient's Chronic Conditions and Co-Morbidity Symptoms are Monitored and Maintained or Improved: Monitor and assess patient's chronic conditions and comorbid symptoms for stability, deterioration, or improvement     Problem: Respiratory - Adult  Goal: Achieves optimal ventilation and oxygenation  3/15/2024 2242 by Linda Gallego RN  Outcome: Progressing  3/15/2024 2241 by Linda Gallego RN  Outcome: Progressing  3/15/2024 1221 by Alden

## 2024-03-16 NOTE — PLAN OF CARE
Problem: Respiratory - Adult  Goal: Clear lung sounds  3/16/2024 1658 by Nolvia Hager, RCP  Outcome: Progressing   Pt continues on aerosols for maintenance of COPD and to clear lung sounds/improve aeration and pt does txs at home. Patient mutually agreed on goals.

## 2024-03-16 NOTE — RT PROTOCOL NOTE
RT Inhaler-Nebulizer Bronchodilator Protocol Note    There is a bronchodilator order in the chart from a provider indicating to follow the RT Bronchodilator Protocol and there is an “Initiate RT Inhaler-Nebulizer Bronchodilator Protocol” order as well (see protocol at bottom of note).    CXR Findings:  No results found.    The findings from the last RT Protocol Assessment were as follows:   History Pulmonary Disease: Chronic pulmonary disease  Respiratory Pattern: Regular pattern and RR 12-20 bpm  Breath Sounds: Slightly diminished and/or crackles  Cough: Strong, spontaneous, non-productive  Indication for Bronchodilator Therapy: Decreased or absent breath sounds, On home bronchodilators  Bronchodilator Assessment Score: 4    Aerosolized bronchodilator medication orders have been revised according to the RT Inhaler-Nebulizer Bronchodilator Protocol below.    Respiratory Therapist to perform RT Therapy Protocol Assessment initially then follow the protocol.  Repeat RT Therapy Protocol Assessment PRN for score 0-3 or on second treatment, BID, and PRN for scores above 3.    No Indications - adjust the frequency to every 6 hours PRN wheezing or bronchospasm, if no treatments needed after 48 hours then discontinue using Per Protocol order mode.     If indication present, adjust the RT bronchodilator orders based on the Bronchodilator Assessment Score as indicated below.  Use Inhaler orders unless patient has one or more of the following: on home nebulizer, not able to hold breath for 10 seconds, is not alert and oriented, cannot activate and use MDI correctly, or respiratory rate 25 breaths per minute or more, then use the equivalent nebulizer order(s) with same Frequency and PRN reasons based on the score.  If a patient is on this medication at home then do not decrease Frequency below that used at home.    0-3 - enter or revise RT bronchodilator order(s) to equivalent RT Bronchodilator order with Frequency of every 4

## 2024-03-16 NOTE — PLAN OF CARE
Problem: Discharge Planning  Goal: Discharge to home or other facility with appropriate resources  3/16/2024 1236 by Leigha Ruano RN  Outcome: Progressing  Flowsheets (Taken 3/16/2024 0745)  Discharge to home or other facility with appropriate resources: Identify barriers to discharge with patient and caregiver  3/15/2024 2241 by Linda Gallego RN  Outcome: Progressing     Problem: Pain  Goal: Verbalizes/displays adequate comfort level or baseline comfort level  3/16/2024 1236 by Leigha Ruano RN  Outcome: Progressing  Flowsheets (Taken 3/14/2024 1232)  Verbalizes/displays adequate comfort level or baseline comfort level: Encourage patient to monitor pain and request assistance  3/15/2024 2241 by Linda Gallego RN  Outcome: Progressing     Problem: Safety - Adult  Goal: Free from fall injury  3/16/2024 1236 by Leigha Ruano RN  Outcome: Progressing  Flowsheets (Taken 3/14/2024 1232)  Free From Fall Injury: Instruct family/caregiver on patient safety  3/15/2024 2242 by Linda Gallego RN  Outcome: Progressing  3/15/2024 2241 by Linda Gallego RN  Outcome: Progressing     Problem: Chronic Conditions and Co-morbidities  Goal: Patient's chronic conditions and co-morbidity symptoms are monitored and maintained or improved  3/16/2024 1236 by Leigha Ruano RN  Outcome: Progressing  Flowsheets (Taken 3/16/2024 0745)  Care Plan - Patient's Chronic Conditions and Co-Morbidity Symptoms are Monitored and Maintained or Improved: Monitor and assess patient's chronic conditions and comorbid symptoms for stability, deterioration, or improvement  3/15/2024 2242 by Linda Gallego RN  Outcome: Progressing  3/15/2024 2241 by Linda Gallego RN  Outcome: Progressing     Problem: Respiratory - Adult  Goal: Achieves optimal ventilation and oxygenation  3/16/2024 1236 by Leigha Ruano RN  Outcome: Progressing  Flowsheets (Taken 3/16/2024 0745)  Achieves optimal ventilation and oxygenation: Assess for

## 2024-03-16 NOTE — PLAN OF CARE
Problem: Discharge Planning  Goal: Discharge to home or other facility with appropriate resources  3/15/2024 2241 by Linda Gallego RN  Outcome: Progressing  3/15/2024 1221 by Leigha Ruano RN  Outcome: Progressing  Flowsheets (Taken 3/15/2024 0806)  Discharge to home or other facility with appropriate resources: Identify barriers to discharge with patient and caregiver     Problem: Pain  Goal: Verbalizes/displays adequate comfort level or baseline comfort level  3/15/2024 2241 by Linda Gallego RN  Outcome: Progressing  3/15/2024 1221 by Leigha Ruano RN  Outcome: Progressing  Flowsheets (Taken 3/14/2024 1232)  Verbalizes/displays adequate comfort level or baseline comfort level: Encourage patient to monitor pain and request assistance     Problem: Safety - Adult  Goal: Free from fall injury  3/15/2024 2241 by Linda Gallego RN  Outcome: Progressing  3/15/2024 1221 by Leigha Ruano RN  Outcome: Progressing  Flowsheets (Taken 3/14/2024 1232)  Free From Fall Injury: Instruct family/caregiver on patient safety     Problem: Chronic Conditions and Co-morbidities  Goal: Patient's chronic conditions and co-morbidity symptoms are monitored and maintained or improved  3/15/2024 2241 by Linda Gallego RN  Outcome: Progressing  3/15/2024 1221 by Leigha Ruano RN  Outcome: Progressing  Flowsheets (Taken 3/15/2024 0806)  Care Plan - Patient's Chronic Conditions and Co-Morbidity Symptoms are Monitored and Maintained or Improved: Monitor and assess patient's chronic conditions and comorbid symptoms for stability, deterioration, or improvement     Problem: Respiratory - Adult  Goal: Achieves optimal ventilation and oxygenation  3/15/2024 2241 by Linda Gallego RN  Outcome: Progressing  3/15/2024 1221 by Leigha Ruano RN  Outcome: Progressing  Flowsheets (Taken 3/15/2024 0806)  Achieves optimal ventilation and oxygenation: Assess for changes in respiratory status

## 2024-03-16 NOTE — CONSULTS
[facility]    Patient - Alex Martell   MRN -  [id number]   [facility] # - [id number]   - 1975      Date of Admission -  3/13/2024 10:35 AM  Date of evaluation -  3/16/2024  Room - -[facility]-A   Hospital Day - 3  Consulting - Sami Roblero PA-C Primary Care Physician - No primary care provider on file.   Chief Complaint   Pulmonary Hypertension  Active Hospital Problem List      Active Hospital Problems    Diagnosis Date Noted    Anasarca [R60.1] 2024    Acute on chronic congestive heart failure (HCC) [I50.9] 2024     HPI   Alex Martell is a 48 y.o. male with past medical history of Asthma, recurrent pneumonia/pleurisy, tobacco and alcohol use presented 2024 due to complaints of shortness of breath and lower extremity swelling. He was hypoxic on room air and required 2 liters by nasal canula. CTA chest showed acute subsegmental pulmonary embolism left lower lobe with right sided heart strain. CT abdomen showed anasarca and ascites. Echocardiogram showed Ef 50%, dilated right ventricle with reduced systolic function, and RVSP 39 mmHg. He received Lasix, and was started on heparin drip.   LE doppler showed no DVT  Past Medical History         Diagnosis Date    Asthma       Past Surgical History           Procedure Laterality Date    HERNIA REPAIR  ,,    3 hernia repairs    HYDROCELE EXCISION       Diet    ADULT DIET; Regular; 1500 ml  Allergies    Patient has no known allergies.  Social History     Social History     Socioeconomic History    Marital status:      Spouse name: Not on file    Number of children: Not on file    Years of education: Not on file    Highest education level: Not on file   Occupational History    Not on file   Tobacco Use    Smoking status: Every Day     Current packs/day: 2.00     Average packs/day: 2.0 packs/day for 20.0 years (40.0 ttl pk-yrs)     Types: Cigarettes    Smokeless tobacco: Never

## 2024-03-17 VITALS
BODY MASS INDEX: 22.07 KG/M2 | HEIGHT: 62 IN | RESPIRATION RATE: 17 BRPM | DIASTOLIC BLOOD PRESSURE: 84 MMHG | WEIGHT: 119.93 LBS | OXYGEN SATURATION: 96 % | HEART RATE: 92 BPM | SYSTOLIC BLOOD PRESSURE: 109 MMHG | TEMPERATURE: 98 F

## 2024-03-17 PROCEDURE — 2580000003 HC RX 258: Performed by: STUDENT IN AN ORGANIZED HEALTH CARE EDUCATION/TRAINING PROGRAM

## 2024-03-17 PROCEDURE — 6360000002 HC RX W HCPCS: Performed by: STUDENT IN AN ORGANIZED HEALTH CARE EDUCATION/TRAINING PROGRAM

## 2024-03-17 PROCEDURE — 6360000002 HC RX W HCPCS: Performed by: PHYSICIAN ASSISTANT

## 2024-03-17 PROCEDURE — 94761 N-INVAS EAR/PLS OXIMETRY MLT: CPT

## 2024-03-17 PROCEDURE — 6370000000 HC RX 637 (ALT 250 FOR IP): Performed by: PHYSICIAN ASSISTANT

## 2024-03-17 PROCEDURE — 99232 SBSQ HOSP IP/OBS MODERATE 35: CPT | Performed by: INTERNAL MEDICINE

## 2024-03-17 PROCEDURE — 6370000000 HC RX 637 (ALT 250 FOR IP)

## 2024-03-17 PROCEDURE — 94760 N-INVAS EAR/PLS OXIMETRY 1: CPT

## 2024-03-17 PROCEDURE — 94640 AIRWAY INHALATION TREATMENT: CPT

## 2024-03-17 RX ORDER — BUMETANIDE 1 MG/1
1 TABLET ORAL DAILY
Qty: 30 TABLET | Refills: 1 | Status: SHIPPED | OUTPATIENT
Start: 2024-03-17

## 2024-03-17 RX ORDER — NICOTINE 21 MG/24HR
1 PATCH, TRANSDERMAL 24 HOURS TRANSDERMAL DAILY
Qty: 30 PATCH | Refills: 3 | Status: SHIPPED | OUTPATIENT
Start: 2024-03-18

## 2024-03-17 RX ADMIN — ALBUTEROL SULFATE 2.5 MG: 2.5 SOLUTION RESPIRATORY (INHALATION) at 16:29

## 2024-03-17 RX ADMIN — APIXABAN 10 MG: 5 TABLET, FILM COATED ORAL at 06:12

## 2024-03-17 RX ADMIN — APIXABAN 10 MG: 5 TABLET, FILM COATED ORAL at 17:25

## 2024-03-17 RX ADMIN — SODIUM CHLORIDE, PRESERVATIVE FREE 10 ML: 5 INJECTION INTRAVENOUS at 08:48

## 2024-03-17 RX ADMIN — BUMETANIDE 1 MG: 0.25 INJECTION INTRAMUSCULAR; INTRAVENOUS at 08:48

## 2024-03-17 RX ADMIN — ALBUTEROL SULFATE 2.5 MG: 2.5 SOLUTION RESPIRATORY (INHALATION) at 09:24

## 2024-03-17 NOTE — PROGRESS NOTES
Hospitalist Progress Note    Patient:  Alex Martell      Unit/Bed:6K-05/005-A    YOB: 1975    MRN: [id number]       Acct: [id number]     PCP: No primary care provider on file.    Date of Admission: 3/13/2024    Assessment/Plan:    Suspected right heart failure:   Continue IV diuretics, patient down 9 pounds since admission  Echo obtained with evidence of right heart strain, Cardiology consulted  Cardiology reviewed imaging and reported that PE is not cause of right heart strain, recommended pulmonology consultation  Pulmonology consulted  Strict I&O's  Daily weights  Sodium and fluid restriction  Consider Albumin IV for hypotension    Possible acute subsegmental PE:    Noted on CTA chest, Heparin gtt started then stopped for thought of false positive, therapeutic Lovenox ordered, convert to PO anticoagulation per Cardiology  Doppler US RLE ordered for increased edema compared to LLE- negative for DVT RLE     Acute Hypoxic Respiratory Failure, hx of Asthma, Suspected Group 3 Pulmonary HTN:  Wean supplemental O2 as tolerated   RT protocol ordered  Pulmonary Hygiene  Pulmonology consulted at recommendation of Cardiology, appreciate recs  Continue Diuretics    Hyponatremia: Resolved    Erythrocytosis: Noted,  likely secondary to smoking and long-standing hypoxia.  Improved after fluids    Tobacco abuse: Endorses smoking 2-2.5 PPD with 40 PPY.  Counseled on smoking cessation.  Nicotine patch offered.    Alcohol dependence: Endorses drinking 4-6 beer cans daily x 20 years.  Denies any prior signs of withdrawal including autonomic dysfunction, hallucinations, tremors, or DTs.    Monitor for signs of alcohol withdrawal    Chief Complaint: Swelling      Subjective: 48 y.o. male admitted to the hospitalist service with shortness of breath. Patient reports he feels like he is breathing better. Patient denies chest pain. He denies nausea or vomiting.    Medications:    Infusion Medications    dextrose   
    Hospitalist Progress Note    Patient:  Alex Martell      Unit/Bed:6K-05/005-A    YOB: 1975    MRN: [id number]       Acct: [id number]     PCP: No primary care provider on file.    Date of Admission: 3/13/2024    Assessment/Plan:    Suspected right heart failure:   Continue IV diuretics, patient down 9 pounds since admission  Echo pending  Consider Cardiology consultation pending results  Strict I&O's  Daily weights  Sodium and fluid restriction    Possible acute subsegmental PE:    Noted on CTA chest, Heparin gtt started then stopped, will order therapeutic Lovenox for the time being  Await echo results  Doppler US RLE ordered for increased edema compared to LLE      Acute Hypoxic Respiratory Failure, hx of Asthma, Possibly 2/2 COPD with acute exacerbation:  Wean supplemental O2 as tolerated   RT protocol ordered  Pulmonary Hygiene  Outpatient referral to Pulm upon d/c    Hyponatremia: Resolved    Erythrocytosis: Noted,  likely secondary to smoking and long-standing hypoxia.  Improved after fluids    Tobacco abuse: Endorses smoking 2-2.5 PPD with 40 PPY.  Counseled on smoking cessation.  Nicotine patch offered.    Alcohol dependence: Endorses drinking 4-6 beer cans daily x 20 years.  Denies any prior signs of withdrawal including autonomic dysfunction, hallucinations, tremors, or DTs.    Monitor for signs of alcohol withdrawal    Chief Complaint: Swelling      Subjective: 48 y.o. male admitted to the hospitalist service with shortness of breath. Patient denies chest pain. He denies nausea or vomiting. Patient started on supplemental oxygen to maintain saturations today.    Medications:    Infusion Medications    dextrose      sodium chloride       Scheduled Medications    albuterol  2.5 mg Nebulization BID    sodium chloride flush  5-40 mL IntraVENous 2 times per day    nicotine  1 patch TransDERmal Daily    bumetanide  1 mg IntraVENous BID     PRN Meds: albuterol, potassium chloride **OR** 
  [facility], Critical Care and Sleep Medicine    Patient - Alex Martell   MRN -  [id number]   [facility] # - [id number]   - 1975      Date of Admission -  3/13/2024 10:35 AM  Date of evaluation -  3/17/2024  Room - -[facility]-A   Hospital Day - 4  Consulting - Sami Roblero PA-C Primary Care Physician - No primary care provider on file.   Chief Complaint   Pulmonary Hypertension  Active Hospital Problem List      Active Hospital Problems    Diagnosis Date Noted    Anasarca [R60.1] 2024    Acute on chronic congestive heart failure (HCC) [I50.9] 2024     HPI   Alex Martell is a 48 y.o. male with past medical history of Asthma, recurrent pneumonia/pleurisy, tobacco and alcohol use presented 2024 due to complaints of shortness of breath and lower extremity swelling. He was hypoxic on room air and required 2 liters by nasal canula. CTA chest showed acute subsegmental pulmonary embolism left lower lobe with right sided heart strain. CT abdomen showed anasarca and ascites. Echocardiogram showed Ef 50%, dilated right ventricle with reduced systolic function, and RVSP 39 mmHg. He received Lasix, and was started on heparin drip.   LE doppler showed no DVT    Subjective   No acute events overnight  Weaned off to room air  Afebrile, would like to go home   Past Surgical History           Procedure Laterality Date    HERNIA REPAIR  ,,    3 hernia repairs    HYDROCELE EXCISION       Diet    ADULT DIET; Regular; 1500 ml  Allergies    Patient has no known allergies.  Social History     Social History     Socioeconomic History    Marital status:      Spouse name: Not on file    Number of children: Not on file    Years of education: Not on file    Highest education level: Not on file   Occupational History    Not on file   Tobacco Use    Smoking status: Every Day     Current packs/day: 2.00     Average packs/day: 2.0 packs/day for 20.0 years (40.0 ttl pk-yrs)     Types: 
  Patient educated on how to use incentive spirometer. Patient verbalized understanding and demonstrated proper use. Emphasized importance and usage of device, with coughing and deep breathing every 4 hours while awake.            
  Patient educated on how to use incentive spirometer. Patient verbalized understanding and demonstrated proper use. Emphasized importance and usage of device, with coughing and deep breathing every 4 hours while awake.            
A home oxygen evaluation has been completed.     [x]Patient is an inpatient. It is expected that the patient will be discharged within the next 48 hours.  Qualified provider to write orders for possible sleep study or home oxygen prescription.Social service/care managers will arrange for home oxygen if ordered.  If patient is active, arrange for Home Medical supplier to assess for Oxygen Conserving Device per pulse oximetry.  []Patient is an outpatient. Results will be faxed to the ordering provider. Qualified provider to write orders for possible sleep study or home oxygen prescription.    Patient was placed on room air for at least 6 hours. SpO2 was 96% on room air at rest. Patients SpO2 was 89% or above and did not qualify for home oxygen. Patient was walked for 6 minutes. SpO2 was 94% during walking. Patients SpO2 was 89% or above and did not qualify for home oxygen. Patient does not have a positive pressure airway device at home. Patient is not  diagnosed with Obstructive Sleep Apnea. Patient had a nocturnal pulse ox done last night on room air that was ordered by Dr. Cassidy and results given to Dr Cassidy. Dr SOY Roblero ordered the home O2 eval, I let him know that the pt had a nocturnal pulse ox last night on room air and results in pts chart/folder.    
Discharge education complete with pt and spouse. Both expressed understanding of teaching. Pt asked provider for meds to be sent to inpatient pharmacy and he will pick medication up in the morning. Pt provided with HS dose of eliquis by writer and educated on next dose. Pt also educated on importance of monitoring for signs and symptoms of bleeding as well as blood pressure.   
Patient educated on how to use incentive spirometer. Patient verbalized understanding and demonstrated proper use. Emphasized importance and usage of device, with coughing and deep breathing every 4 hours while awake.     
Patient is enrolled in [facility], please send discharge medication to [facility]. Thank you! [facility] - Prescription Assistance ([phone]) 3/14/2024,10:49 AM    
Pharmacy Medication History Note      List of current medications patient is taking is complete.    Source of information: Patient    Changes made to medication list:  Medications removed (include reason, ex. therapy complete or physician discontinued):  Duplicate albuterol  Norco - not taking    Medications added/doses adjusted:  None    Other notes (ex. Recent course of antibiotics, Coumadin dosing):  Patient does not have insurance and is unable to afford inhalers at home.    Denies use of other OTC or herbal medications.      Allergies reviewed      Electronically signed by Juanita Marroquin RPH on 3/13/2024 at 4:53 PM                                                     
errors which are inherent in voice recognition technology.**      Final report electronically signed by Dr Georgiana Villalpando on 3/13/2024 11:26 AM          Diet: ADULT DIET; Regular; 1500 ml    DVT prophylaxis: [x] Lovenox                                 [] SCDs                                 [] SQ Heparin                                 [] Encourage ambulation           [] Already on Anticoagulation     Disposition:    [x] Home       [] TCU       [] Rehab       [] Psych       [] SNF       [] Long Term Care Facility       [] Other-    Code Status: Full Code      Electronically signed by Sami Roblero PA-C on 3/16/2024 at 9:51 AM

## 2024-03-17 NOTE — DISCHARGE SUMMARY
Hospital Medicine Discharge Summary      Patient Identification:   Alex Martell   : 1975  MRN: [id number]   Account: [id number]      Patient's PCP: No primary care provider on file.    Admit Date: 3/13/2024     Discharge Date: 3/17/24    Admitting Physician: No admitting provider for patient encounter.     Discharge Physician: Sami Roblero PA-C     Alex Martell is a 48 y.o. male admitted to [facility] on 3/13/2024.      HPI On Admission From H&P:  ***      Assessment/Plan With Discharge Diagnoses:    Patient prescribed Eliquis, Bumex, and Nicotine patches. Home O2 eval performed by RT prior to d/c.    Exam:     Vitals:  Vitals:    24 0428 24 0845 24 0924 24 1230   BP: 121/80 102/71  109/84   Pulse: 89 90  92   Resp:    Temp: 98.1 °F (36.7 °C) 97.7 °F (36.5 °C)  98 °F (36.7 °C)   TempSrc: Oral Oral  Oral   SpO2: 93% 93% 94% 97%   Weight:       Height:         Weight: Weight - Scale: 54.4 kg (119 lb 14.9 oz)     24 hour intake/output:  Intake/Output Summary (Last 24 hours) at 3/17/2024 1350  Last data filed at 3/17/2024 0845  Gross per 24 hour   Intake 240 ml   Output --   Net 240 ml         Labs: For convenience and continuity at follow-up the following most recent labs are provided:    CBC:    Lab Results   Component Value Date/Time    WBC 8.2 2024 05:55 AM    HGB 17.6 2024 05:55 AM    HCT 54.3 2024 05:55 AM     2024 05:55 AM       Renal:    Lab Results   Component Value Date/Time     2024 05:55 AM    K 4.1 2024 05:55 AM    CL 96 2024 05:55 AM    CO2 32 2024 05:55 AM    BUN 16 2024 05:55 AM    CREATININE 0.7 2024 05:55 AM    CALCIUM 9.4 2024 05:55 AM         Significant Diagnostic Studies    Radiology:   Vascular duplex lower extremity venous right   Final Result   Normal venous ultrasound. No evidence for acute deep venous thrombosis.            **This report has

## 2024-03-17 NOTE — PLAN OF CARE
Problem: Respiratory - Adult  Goal: Clear lung sounds  Outcome: Progressing   Pt continues on aerosols for maintenance of COPD and to clear lung sounds/improve aeration and pt does txs at home. Patient mutually agreed on goals.

## 2024-03-19 ENCOUNTER — OFFICE VISIT (OUTPATIENT)
Dept: FAMILY MEDICINE CLINIC | Age: 49
End: 2024-03-19

## 2024-03-19 ENCOUNTER — TELEPHONE (OUTPATIENT)
Dept: CARDIOLOGY CLINIC | Age: 49
End: 2024-03-19

## 2024-03-19 VITALS
BODY MASS INDEX: 20.45 KG/M2 | TEMPERATURE: 96 F | WEIGHT: 115.4 LBS | OXYGEN SATURATION: 93 % | SYSTOLIC BLOOD PRESSURE: 122 MMHG | HEIGHT: 63 IN | DIASTOLIC BLOOD PRESSURE: 88 MMHG | HEART RATE: 92 BPM

## 2024-03-19 DIAGNOSIS — I26.93 SINGLE SUBSEGMENTAL PULMONARY EMBOLISM WITHOUT ACUTE COR PULMONALE (HCC): ICD-10-CM

## 2024-03-19 DIAGNOSIS — Z09 HOSPITAL DISCHARGE FOLLOW-UP: Primary | ICD-10-CM

## 2024-03-19 DIAGNOSIS — R60.1 ANASARCA: ICD-10-CM

## 2024-03-19 DIAGNOSIS — D75.1 POLYCYTHEMIA: ICD-10-CM

## 2024-03-19 DIAGNOSIS — J44.1 CHRONIC OBSTRUCTIVE PULMONARY DISEASE WITH ACUTE EXACERBATION (HCC): ICD-10-CM

## 2024-03-19 DIAGNOSIS — Z83.49 FAMILY HISTORY OF ALPHA 1 ANTITRYPSIN DEFICIENCY: ICD-10-CM

## 2024-03-19 DIAGNOSIS — I50.813 ACUTE ON CHRONIC RIGHT-SIDED CONGESTIVE HEART FAILURE (HCC): ICD-10-CM

## 2024-03-19 DIAGNOSIS — Z72.0 TOBACCO ABUSE DISORDER: ICD-10-CM

## 2024-03-19 PROCEDURE — 99204 OFFICE O/P NEW MOD 45 MIN: CPT | Performed by: NURSE PRACTITIONER

## 2024-03-19 PROCEDURE — 1111F DSCHRG MED/CURRENT MED MERGE: CPT | Performed by: NURSE PRACTITIONER

## 2024-03-19 RX ORDER — ALBUTEROL SULFATE 1.25 MG/3ML
1 SOLUTION RESPIRATORY (INHALATION) 2 TIMES DAILY
COMMUNITY
End: 2024-03-19 | Stop reason: SDUPTHER

## 2024-03-19 RX ORDER — ALBUTEROL SULFATE 90 UG/1
2 AEROSOL, METERED RESPIRATORY (INHALATION) EVERY 6 HOURS PRN
Qty: 1 EACH | Refills: 5 | Status: SHIPPED | OUTPATIENT
Start: 2024-03-19

## 2024-03-19 RX ORDER — ALBUTEROL SULFATE 1.25 MG/3ML
1 SOLUTION RESPIRATORY (INHALATION) EVERY 6 HOURS PRN
Qty: 360 ML | Refills: 3 | Status: SHIPPED | OUTPATIENT
Start: 2024-03-19

## 2024-03-19 RX ORDER — NEBULIZER ACCESSORIES
1 KIT MISCELLANEOUS DAILY PRN
Qty: 2 KIT | Refills: 0 | Status: SHIPPED | OUTPATIENT
Start: 2024-03-19

## 2024-03-19 SDOH — ECONOMIC STABILITY: FOOD INSECURITY: WITHIN THE PAST 12 MONTHS, YOU WORRIED THAT YOUR FOOD WOULD RUN OUT BEFORE YOU GOT MONEY TO BUY MORE.: NEVER TRUE

## 2024-03-19 SDOH — ECONOMIC STABILITY: HOUSING INSECURITY
IN THE LAST 12 MONTHS, WAS THERE A TIME WHEN YOU DID NOT HAVE A STEADY PLACE TO SLEEP OR SLEPT IN A SHELTER (INCLUDING NOW)?: NO

## 2024-03-19 SDOH — ECONOMIC STABILITY: FOOD INSECURITY: WITHIN THE PAST 12 MONTHS, THE FOOD YOU BOUGHT JUST DIDN'T LAST AND YOU DIDN'T HAVE MONEY TO GET MORE.: NEVER TRUE

## 2024-03-19 SDOH — ECONOMIC STABILITY: INCOME INSECURITY: HOW HARD IS IT FOR YOU TO PAY FOR THE VERY BASICS LIKE FOOD, HOUSING, MEDICAL CARE, AND HEATING?: NOT HARD AT ALL

## 2024-03-19 ASSESSMENT — PATIENT HEALTH QUESTIONNAIRE - PHQ9
SUM OF ALL RESPONSES TO PHQ QUESTIONS 1-9: 0
2. FEELING DOWN, DEPRESSED OR HOPELESS: NOT AT ALL
1. LITTLE INTEREST OR PLEASURE IN DOING THINGS: NOT AT ALL
SUM OF ALL RESPONSES TO PHQ9 QUESTIONS 1 & 2: 0
SUM OF ALL RESPONSES TO PHQ QUESTIONS 1-9: 0

## 2024-03-19 NOTE — TELEPHONE ENCOUNTER
Diagnosis Information    Diagnosis   R60.1 (ICD-10-CM) - Anasarca   I50.813 (ICD-10-CM) - Acute on chronic right-sided congestive heart failure (HCC)   J44.1 (ICD-10-CM) - Chronic obstructive pulmonary disease with acute exacerbation (HCC)   I26.93 (ICD-10-CM) - Single subsegmental pulmonary embolism without acute cor pulmonale (HCC)   Z83.49 (ICD-10-CM) - Family history of alpha 1 antitrypsin deficiency   D75.1 (ICD-10-CM) - Polycythemia       LM for pt to call office for a new pt appt

## 2024-03-19 NOTE — TELEPHONE ENCOUNTER
Pt was actually seen during recent admission by Dr Negron.  Hospital f/u appointment scheduled with patient for next Monday with Aparna.

## 2024-03-19 NOTE — PROGRESS NOTES
CONTINUE taking these medications      apixaban starter pack 5 MG Tbpk tablet  Commonly known as: Eliquis DVT/PE Starter Pack  Take 1 tablet by mouth See Admin Instructions     aspirin 81 MG chewable tablet     bumetanide 1 MG tablet  Commonly known as: Bumex  Take 1 tablet by mouth daily     fluticasone-salmeterol 250-50 MCG/DOSE Aepb  Commonly known as: Advair Diskus  Inhale 1 puff into the lungs 2 times daily.     nicotine 21 MG/24HR  Commonly known as: NICODERM CQ  Place 1 patch onto the skin daily               Where to Get Your Medications        These medications were sent to [facility] - [place], [place] - [address] - P [phone] - F [phone]  [address] [address]      Phone: [phone]   albuterol 1.25 MG/3ML nebulizer solution  albuterol sulfate  (90 Base) MCG/ACT inhaler  Nebulizer/Tubing/Mouthpiece Kit           Medications marked \"taking\" at this time  Outpatient Medications Marked as Taking for the 3/19/24 encounter (Office Visit) with Shira Humphrey APRN - CNP   Medication Sig Dispense Refill    albuterol (ACCUNEB) 1.25 MG/3ML nebulizer solution Inhale 3 mLs into the lungs every 6 hours as needed for Wheezing 360 mL 3    albuterol sulfate HFA (VENTOLIN HFA) 108 (90 Base) MCG/ACT inhaler Inhale 2 puffs into the lungs every 6 hours as needed for Wheezing or Shortness of Breath 1 each 5    Respiratory Therapy Supplies (NEBULIZER/TUBING/MOUTHPIECE) KIT 1 kit by Does not apply route daily as needed (breathing treatments) 2 kit 0    apixaban starter pack (ELIQUIS DVT/PE STARTER PACK) 5 MG TBPK tablet Take 1 tablet by mouth See Admin Instructions 74 tablet 0    nicotine (NICODERM CQ) 21 MG/24HR Place 1 patch onto the skin daily 30 patch 3    bumetanide (BUMEX) 1 MG tablet Take 1 tablet by mouth daily 30 tablet 1    aspirin 81 MG chewable tablet Take 1 tablet by mouth daily      fluticasone-salmeterol (ADVAIR DISKUS) 250-50

## 2024-03-20 ENCOUNTER — CARE COORDINATION (OUTPATIENT)
Dept: CARE COORDINATION | Age: 49
End: 2024-03-20

## 2024-03-20 DIAGNOSIS — I50.813 ACUTE ON CHRONIC RIGHT-SIDED CONGESTIVE HEART FAILURE (HCC): ICD-10-CM

## 2024-03-20 DIAGNOSIS — R60.1 ANASARCA: Primary | ICD-10-CM

## 2024-03-20 DIAGNOSIS — I26.93 SINGLE SUBSEGMENTAL PULMONARY EMBOLISM WITHOUT ACUTE COR PULMONALE (HCC): ICD-10-CM

## 2024-03-20 LAB — NUCLEAR IGG SER QL IA: NORMAL

## 2024-03-20 SDOH — SOCIAL STABILITY: SOCIAL NETWORK: ARE YOU MARRIED, WIDOWED, DIVORCED, SEPARATED, NEVER MARRIED, OR LIVING WITH A PARTNER?: MARRIED

## 2024-03-20 SDOH — HEALTH STABILITY: MENTAL HEALTH: HOW MANY STANDARD DRINKS CONTAINING ALCOHOL DO YOU HAVE ON A TYPICAL DAY?: 3 OR 4

## 2024-03-20 SDOH — SOCIAL STABILITY: SOCIAL NETWORK: HOW OFTEN DO YOU ATTENT MEETINGS OF THE CLUB OR ORGANIZATION YOU BELONG TO?: NEVER

## 2024-03-20 SDOH — SOCIAL STABILITY: SOCIAL NETWORK
DO YOU BELONG TO ANY CLUBS OR ORGANIZATIONS SUCH AS CHURCH GROUPS UNIONS, FRATERNAL OR ATHLETIC GROUPS, OR SCHOOL GROUPS?: NO

## 2024-03-20 SDOH — ECONOMIC STABILITY: FOOD INSECURITY: WITHIN THE PAST 12 MONTHS, YOU WORRIED THAT YOUR FOOD WOULD RUN OUT BEFORE YOU GOT MONEY TO BUY MORE.: NEVER TRUE

## 2024-03-20 SDOH — HEALTH STABILITY: MENTAL HEALTH: HOW OFTEN DO YOU HAVE A DRINK CONTAINING ALCOHOL?: 4 OR MORE TIMES A WEEK

## 2024-03-20 SDOH — ECONOMIC STABILITY: INCOME INSECURITY: IN THE LAST 12 MONTHS, WAS THERE A TIME WHEN YOU WERE NOT ABLE TO PAY THE MORTGAGE OR RENT ON TIME?: NO

## 2024-03-20 SDOH — SOCIAL STABILITY: SOCIAL NETWORK: HOW OFTEN DO YOU GET TOGETHER WITH FRIENDS OR RELATIVES?: MORE THAN THREE TIMES A WEEK

## 2024-03-20 SDOH — ECONOMIC STABILITY: FOOD INSECURITY: WITHIN THE PAST 12 MONTHS, THE FOOD YOU BOUGHT JUST DIDN'T LAST AND YOU DIDN'T HAVE MONEY TO GET MORE.: NEVER TRUE

## 2024-03-20 SDOH — HEALTH STABILITY: MENTAL HEALTH
STRESS IS WHEN SOMEONE FEELS TENSE, NERVOUS, ANXIOUS, OR CAN'T SLEEP AT NIGHT BECAUSE THEIR MIND IS TROUBLED. HOW STRESSED ARE YOU?: VERY MUCH

## 2024-03-20 SDOH — ECONOMIC STABILITY: INCOME INSECURITY: HOW HARD IS IT FOR YOU TO PAY FOR THE VERY BASICS LIKE FOOD, HOUSING, MEDICAL CARE, AND HEATING?: SOMEWHAT HARD

## 2024-03-20 SDOH — ECONOMIC STABILITY: HOUSING INSECURITY: IN THE LAST 12 MONTHS, HOW MANY PLACES HAVE YOU LIVED?: 1

## 2024-03-20 SDOH — SOCIAL STABILITY: SOCIAL NETWORK
IN A TYPICAL WEEK, HOW MANY TIMES DO YOU TALK ON THE PHONE WITH FAMILY, FRIENDS, OR NEIGHBORS?: MORE THAN THREE TIMES A WEEK

## 2024-03-20 ASSESSMENT — ENCOUNTER SYMPTOMS: DYSPNEA ASSOCIATED WITH: EXERTION

## 2024-03-20 NOTE — CARE COORDINATION
[facility]    Pt is working to get approved for Medicaid but currently w/out ins. Has had wt loss over the years. Wt down to 115 lbs. New dx of CHF.  Only eats 1 meal of day most days.  Drinking coffee and beer primarily.  Currently not able to work d/t recent hospitalization. I have educated pt on incorporating more water and pt is trying to refrain from drinking beer.  Has appt with CHF clinic on 4/1.  PCP suggested nutritional drinks but d/t no insurance not sure pt will be able to afford at this time.  Would benefit from general diet education and mailing of diet ed. material.  Pt agreeable to your call.    RD contacted Alex Martell regarding Dietitian referral. Pt answered, RD explained reason for call and role in care. Pt requested RD call back on a different day. Patient prefers a call tomorrow 3/21/24 in the morning. RD will contact patient as requested and follow up as appropriate.       Edel Mejía RDN, LD  [phone]

## 2024-03-20 NOTE — CARE COORDINATION
Ambulatory Care Coordination Note  3/20/2024    Patient Current Location:  Home: [address]  [address] [facility] [address]    ACM contacted the patient by telephone. Verified name and  with patient as identifiers. Provided introduction to self, and explanation of the ACM role.     ACM: Gayathri Alva RN    Challenges to be reviewed by the provider   Additional needs identified to be addressed with provider:yes:   Pt needs digital scale for daily wt monitoring.  Please send script for digital scale to [facility]'s outpt pharmacy.  Please advise.      (I am going to complete [employer] voucher so pt will be able to get scale at no cost).              Method of communication with provider: chart routing.    Alex was referred to care coordination by PCP for education and assistance in managing his chronic conditions and assisting with resource needs.   Pt recently admitted to hospital with anasarca, PE, CHF.  Spoke with Alex today.   Pt had appt with PCP yesterday to get established.  Referral was placed to PULM. He is aware that office will call to arrange appt.  PFT's and bronchial challenge ordered.   Pt has appt with cardio 3/25 and CHF clinic on   Pt is using [facility] [employer] for assistance with medications at this time d/t no insurance.  He has started the process to apply for Medicaid.    CHF: pt is now on Bumex. Pt has old set of scales that are not easy to read.  Message routed to PCP for script for digital scales.  Encouraged pt to start monitoring wts every morning after voiding.  Educated pt that if having 3 pound wt gain in 1 day or 5 pounds in 1 wk he needs to call.   Discussed diet.  Educated on sodium consumption.  Pt does not drink much other than coffee and beer.  I encouraged pt to refrain from alcohol consumption and to try to start drinking some water t/o the day. Denies edema to ext's at this time.   Pt has lost wt over the years.  Eats appx 1 meal per day.  PCP suggested pt

## 2024-03-21 ENCOUNTER — CARE COORDINATION (OUTPATIENT)
Dept: CARE COORDINATION | Age: 49
End: 2024-03-21

## 2024-03-21 NOTE — CARE COORDINATION
Alex Martell  March 21, 2024    Initial Referral Reason: New Diagnosis of CHF and Unintentional Weight Loss    Patient Care Team:  Gayathri Alva RN as Ambulatory Care Manager  Edel Mejía RD, LD as Dietitian (Dietitian Registered)    Past Medical History:    Current Outpatient Medications   Medication Sig Dispense Refill    Misc. Devices (DIGITAL GLASS SCALE) MISC 1 Device by Does not apply route once for 1 dose For daily weights. 1 each 0    albuterol (ACCUNEB) 1.25 MG/3ML nebulizer solution Inhale 3 mLs into the lungs every 6 hours as needed for Wheezing 360 mL 3    albuterol sulfate HFA (VENTOLIN HFA) 108 (90 Base) MCG/ACT inhaler Inhale 2 puffs into the lungs every 6 hours as needed for Wheezing or Shortness of Breath 1 each 5    Respiratory Therapy Supplies (NEBULIZER/TUBING/MOUTHPIECE) KIT 1 kit by Does not apply route daily as needed (breathing treatments) 2 kit 0    apixaban starter pack (ELIQUIS DVT/PE STARTER PACK) 5 MG TBPK tablet Take 1 tablet by mouth See Admin Instructions 74 tablet 0    nicotine (NICODERM CQ) 21 MG/24HR Place 1 patch onto the skin daily 30 patch 3    bumetanide (BUMEX) 1 MG tablet Take 1 tablet by mouth daily 30 tablet 1    aspirin 81 MG chewable tablet Take 1 tablet by mouth daily      fluticasone-salmeterol (ADVAIR DISKUS) 250-50 MCG/DOSE AEPB Inhale 1 puff into the lungs 2 times daily. (Patient not taking: Reported on 3/20/2024) 60 each 5     No current facility-administered medications for this visit.       Biochemical Data, Medical Tests and Procedures:    No results found for: \"LABA1C\"  No results found for: \"EAG\"    No results found for: \"CHOL\"  No results found for: \"TRIG\"  No results found for: \"HDL\"  No results found for: \"LDLCALC\", \"LDLCHOLESTEROL\"  No results found for: \"VLDL\"  No results found for: \"CHOLHDLRATIO\"      Anthropometric Measurements:    Height: 62.9 inches (159.8 cm)  Weight: 115 lb (52.3 kg)  BMI: 20.51 (normal)  IBW: 123 lb (55. 9 kg)

## 2024-03-25 ENCOUNTER — TELEPHONE (OUTPATIENT)
Dept: FAMILY MEDICINE CLINIC | Age: 49
End: 2024-03-25

## 2024-03-25 ENCOUNTER — OFFICE VISIT (OUTPATIENT)
Dept: CARDIOLOGY CLINIC | Age: 49
End: 2024-03-25
Payer: MEDICAID

## 2024-03-25 VITALS
HEART RATE: 88 BPM | HEIGHT: 63 IN | DIASTOLIC BLOOD PRESSURE: 76 MMHG | BODY MASS INDEX: 20.02 KG/M2 | SYSTOLIC BLOOD PRESSURE: 102 MMHG | RESPIRATION RATE: 18 BRPM | WEIGHT: 113 LBS

## 2024-03-25 DIAGNOSIS — I27.81 COR PULMONALE (CHRONIC) (HCC): Primary | ICD-10-CM

## 2024-03-25 DIAGNOSIS — F17.200 SMOKER: ICD-10-CM

## 2024-03-25 DIAGNOSIS — I50.812 CHRONIC RIGHT-SIDED CHF (CONGESTIVE HEART FAILURE) (HCC): ICD-10-CM

## 2024-03-25 PROCEDURE — 99214 OFFICE O/P EST MOD 30 MIN: CPT | Performed by: NURSE PRACTITIONER

## 2024-03-25 RX ORDER — ALBUTEROL SULFATE 2.5 MG/3ML
2.5 SOLUTION RESPIRATORY (INHALATION) EVERY 6 HOURS PRN
Qty: 360 ML | Refills: 3 | Status: SHIPPED | OUTPATIENT
Start: 2024-03-25

## 2024-03-25 NOTE — PROGRESS NOTES
Housing Stability Vital Sign     Unable to Pay for Housing in the Last Year: No     Number of Places Lived in the Last Year: 1     Unstable Housing in the Last Year: No   Recent Concern: Housing Stability - High Risk (3/19/2024)    Housing Stability Vital Sign     Unable to Pay for Housing in the Last Year: No     Number of Places Lived in the Last Year: 20     Unstable Housing in the Last Year: No       History reviewed. No pertinent family history.    Blood pressure 102/76, pulse 88, resp. rate 18, height 1.598 m (5' 2.9\"), weight 51.3 kg (113 lb).    General:   Well developed, well nourished  Lungs:   Clear to auscultation  Heart:    Normal S1 S2, No murmur, rubs, or gallops  Abdomen:   Soft, non tender, no organomegalies, positive bowel sounds  Extremities:   No edema, no cyanosis, good peripheral pulses  Neurological:   Awake, alert, oriented. No obvious focal deficits  Musculoskeletal:  No obvious deformities    EKG: Not done today    Alex is a unfortunate 48-year-old  gentleman who was recently hospitalized for cor pulmonale and right heart failure.  Alex is a chronic smoker of 2 packs a day he is currently down to less than 1 pack a day.  He was admitted to the hospital [facility] [place] and is doing quite well he does have low blood pressure which does hinder GDMT the only thing he is on right now is Bumex at 1 mg a day.    Alex does still have a lot of shortness of breath issues from his cor pulmonale and COPD.  At this that he is down to less than a pack a day smoking.  He is still waiting on his pulmonary evaluation.  He has no complaints of any chest discomfort.  He does exhibit shortness of breath but it is much improved from the prior admission.    He is doing very well with his diet.    His blood pressure today is rather low 102/76.  He does not exhibit any symptoms from his heart rate is regular on auscultation and his heart rate is 88 bpm today.      Alex can follow-up with the

## 2024-03-25 NOTE — TELEPHONE ENCOUNTER
[facility] called and said that on the medication albuterol nebulizer solution the patient can not afford this but if it is switched to the 2.5 mg/3ML it will be paid by their program. Please send in a new prescription if you are ok with this change.

## 2024-03-27 ENCOUNTER — CARE COORDINATION (OUTPATIENT)
Dept: CARE COORDINATION | Age: 49
End: 2024-03-27

## 2024-03-27 ASSESSMENT — ENCOUNTER SYMPTOMS: DYSPNEA ASSOCIATED WITH: EXERTION

## 2024-03-27 NOTE — CARE COORDINATION
Ambulatory Care Coordination Note  3/27/2024    Patient Current Location:  Home: [address]  [address] [facility] [address]     ACM contacted the patient by telephone. Verified name and  with patient as identifiers. Provided introduction to self, and explanation of the ACM role.     Challenges to be reviewed by the provider   Additional needs identified to be addressed with provider: No  none               Method of communication with provider: none.    ACM: Gayathri Alva RN    Alex was referred to care coordination by PCP for education and assistance in managing his chronic conditions and assisting with resource needs.   Pt recently admitted to hospital with anasarca, PE, CHF.    Spoke with Alex today for f/u.   Pt reports that he is doing a little better.    CHF: wt stable .  Had appt with cardiology.  Has appt with CHF clinic next wk.  On Bumex.  Tolerating medications.  Obtained digital scales and has started monitoring daily wts. Denies edema in ext's.  Has MCRAE but denies SOB at rest.   Pt waiting to hear from PULM. Regarding recent referral.  Reports that he has not heard from scheduling re: PFT's and bronchial challenge.  Will contact Central scheduling.   Pt is working with central dietician on low sodium diet.   COPD: Has cut back significantly on smoking. Has albuterol neb to use PRN.  Central scheduling will be reaching out to pt to arrange PFT and bronchial challenge appt. PULM referral still pending at this time  Pt denies SOB at rest.  Has MCRAE.  Coughing up some phlegm. Has chronic cough.     Plan of care:  Call placed to Central scheduling.  They tried reaching pt on the day the order was placed but was unsuccessful.  She will call pt today to schedule.  Verified pt's number.   Will f/u with pulmonary office if pt does not have an appt by next call.   Pt is still working on turning in all required paperwork for Medicaid  Obtained albuterol nebulizer solution.  Used nebulizer today.  Reports

## 2024-03-28 ENCOUNTER — CARE COORDINATION (OUTPATIENT)
Dept: CARE COORDINATION | Age: 49
End: 2024-03-28

## 2024-04-01 ENCOUNTER — HOSPITAL ENCOUNTER (OUTPATIENT)
Age: 49
Discharge: HOME | End: 2024-04-01

## 2024-04-01 ENCOUNTER — OFFICE VISIT (OUTPATIENT)
Dept: CARDIOLOGY CLINIC | Age: 49
End: 2024-04-01
Payer: MEDICAID

## 2024-04-01 VITALS
HEIGHT: 62 IN | BODY MASS INDEX: 20.87 KG/M2 | SYSTOLIC BLOOD PRESSURE: 102 MMHG | DIASTOLIC BLOOD PRESSURE: 64 MMHG | WEIGHT: 113.4 LBS | HEART RATE: 82 BPM | OXYGEN SATURATION: 95 %

## 2024-04-01 DIAGNOSIS — Z91.89 AT RISK FOR FLUID VOLUME OVERLOAD: ICD-10-CM

## 2024-04-01 DIAGNOSIS — I50.32 CHRONIC DIASTOLIC CONGESTIVE HEART FAILURE, NYHA CLASS 2 (HCC): ICD-10-CM

## 2024-04-01 DIAGNOSIS — I50.813 ACUTE ON CHRONIC RIGHT-SIDED HEART FAILURE (HCC): ICD-10-CM

## 2024-04-01 DIAGNOSIS — I50.813 ACUTE ON CHRONIC RIGHT-SIDED HEART FAILURE (HCC): Primary | ICD-10-CM

## 2024-04-01 LAB
ANION GAP SERPL CALC-SCNC: 13 MEQ/L (ref 8–16)
BUN SERPL-MCNC: 15 MG/DL (ref 7–22)
CALCIUM SERPL-MCNC: 9.7 MG/DL (ref 8.5–10.5)
CHLORIDE SERPL-SCNC: 97 MEQ/L (ref 98–111)
CO2 SERPL-SCNC: 32 MEQ/L (ref 23–33)
CREAT SERPL-MCNC: 0.7 MG/DL (ref 0.4–1.2)
GFR SERPL CREATININE-BSD FRML MDRD: > 90 ML/MIN/1.73M2
GLUCOSE SERPL-MCNC: 90 MG/DL (ref 70–108)
MAGNESIUM SERPL-MCNC: 2.4 MG/DL (ref 1.6–2.4)
NT-PROBNP SERPL IA-MCNC: 228.5 PG/ML (ref 0–124)
POTASSIUM SERPL-SCNC: 4.3 MEQ/L (ref 3.5–5.2)
SODIUM SERPL-SCNC: 142 MEQ/L (ref 135–145)

## 2024-04-01 PROCEDURE — 36415 COLL VENOUS BLD VENIPUNCTURE: CPT

## 2024-04-01 PROCEDURE — 80048 BASIC METABOLIC PNL TOTAL CA: CPT

## 2024-04-01 PROCEDURE — 83735 ASSAY OF MAGNESIUM: CPT

## 2024-04-01 PROCEDURE — 99215 OFFICE O/P EST HI 40 MIN: CPT | Performed by: NURSE PRACTITIONER

## 2024-04-01 PROCEDURE — 83880 ASSAY OF NATRIURETIC PEPTIDE: CPT

## 2024-04-01 ASSESSMENT — ENCOUNTER SYMPTOMS
NAUSEA: 0
ABDOMINAL DISTENTION: 0
SHORTNESS OF BREATH: 1
VOMITING: 0
COUGH: 0

## 2024-04-01 NOTE — PATIENT INSTRUCTIONS
You may receive a survey regarding the care you received during your visit.  Your input is valuable to us.  We encourage you to complete and return your survey.  We hope you will choose us in the future for your healthcare needs.    Your nurses today were Mihir.  Office hours:   Mon-Thurs 8-4:30  Friday 8-12  Phone: [phone]    Continue:  Continue current medications  Daily weights and record  Fluid restriction of 2 Liters per day  Limit sodium in diet to around 8679-6141 mg/day  Monitor BP  Activity as tolerated     Call the Heart Failure Clinic for any of the following symptoms:   Weight gain of 2-3 pounds in 1 day or 5 pounds in 1 week  Increased shortness of breath  Shortness of breath while laying down  Cough  Chest pain  Swelling in feet, ankles or legs  Bloating in abdomen  Fatigue

## 2024-04-01 NOTE — PROGRESS NOTES
Laterality Date    HERNIA REPAIR  1976,1990,1998    3 hernia repairs    HYDROCELE EXCISION       History reviewed. No pertinent family history.  Social History     Tobacco Use    Smoking status: Every Day     Current packs/day: 2.00     Average packs/day: 2.0 packs/day for 20.0 years (40.0 ttl pk-yrs)     Types: Cigarettes    Smokeless tobacco: Never   Substance Use Topics    Alcohol use: Yes     Alcohol/week: 4.0 standard drinks of alcohol     Types: 4 Cans of beer per week     Current Outpatient Medications   Medication Sig Dispense Refill    albuterol (PROVENTIL) (2.5 MG/3ML) 0.083% nebulizer solution Take 3 mLs by nebulization every 6 hours as needed for Wheezing 360 mL 3    albuterol (ACCUNEB) 1.25 MG/3ML nebulizer solution Inhale 3 mLs into the lungs every 6 hours as needed for Wheezing 360 mL 3    albuterol sulfate HFA (VENTOLIN HFA) 108 (90 Base) MCG/ACT inhaler Inhale 2 puffs into the lungs every 6 hours as needed for Wheezing or Shortness of Breath 1 each 5    apixaban starter pack (ELIQUIS DVT/PE STARTER PACK) 5 MG TBPK tablet Take 1 tablet by mouth See Admin Instructions 74 tablet 0    nicotine (NICODERM CQ) 21 MG/24HR Place 1 patch onto the skin daily 30 patch 3    bumetanide (BUMEX) 1 MG tablet Take 1 tablet by mouth daily 30 tablet 1    aspirin 81 MG chewable tablet Take 1 tablet by mouth daily      Misc. Devices (DIGITAL GLASS SCALE) MISC 1 Device by Does not apply route once for 1 dose For daily weights. (Patient not taking: Reported on 3/25/2024) 1 each 0    Respiratory Therapy Supplies (NEBULIZER/TUBING/MOUTHPIECE) KIT 1 kit by Does not apply route daily as needed (breathing treatments) (Patient not taking: Reported on 3/25/2024) 2 kit 0    fluticasone-salmeterol (ADVAIR DISKUS) 250-50 MCG/DOSE AEPB Inhale 1 puff into the lungs 2 times daily. (Patient not taking: Reported on 3/20/2024) 60 each 5     No current facility-administered medications for this visit.     No Known Allergies    SUBJECTIVE:

## 2024-04-05 ENCOUNTER — OFFICE VISIT (OUTPATIENT)
Dept: PULMONOLOGY | Age: 49
End: 2024-04-05

## 2024-04-05 ENCOUNTER — TELEPHONE (OUTPATIENT)
Dept: PULMONOLOGY | Age: 49
End: 2024-04-05

## 2024-04-05 ENCOUNTER — CARE COORDINATION (OUTPATIENT)
Dept: CARE COORDINATION | Age: 49
End: 2024-04-05

## 2024-04-05 VITALS
HEART RATE: 88 BPM | SYSTOLIC BLOOD PRESSURE: 132 MMHG | WEIGHT: 114 LBS | DIASTOLIC BLOOD PRESSURE: 80 MMHG | TEMPERATURE: 99.6 F | OXYGEN SATURATION: 94 % | HEIGHT: 62 IN | BODY MASS INDEX: 20.98 KG/M2

## 2024-04-05 DIAGNOSIS — D75.1 POLYCYTHEMIA: ICD-10-CM

## 2024-04-05 DIAGNOSIS — Z72.0 TOBACCO ABUSE DISORDER: ICD-10-CM

## 2024-04-05 DIAGNOSIS — G47.34 NOCTURNAL HYPOXEMIA: ICD-10-CM

## 2024-04-05 DIAGNOSIS — R06.83 SNORING: ICD-10-CM

## 2024-04-05 DIAGNOSIS — J34.2 DNS (DEVIATED NASAL SEPTUM): ICD-10-CM

## 2024-04-05 DIAGNOSIS — I26.93 SINGLE SUBSEGMENTAL PULMONARY EMBOLISM WITHOUT ACUTE COR PULMONALE (HCC): ICD-10-CM

## 2024-04-05 DIAGNOSIS — I50.813 ACUTE ON CHRONIC RIGHT-SIDED CONGESTIVE HEART FAILURE (HCC): ICD-10-CM

## 2024-04-05 DIAGNOSIS — J44.1 CHRONIC OBSTRUCTIVE PULMONARY DISEASE WITH ACUTE EXACERBATION (HCC): Primary | ICD-10-CM

## 2024-04-05 RX ORDER — BUDESONIDE, GLYCOPYRROLATE, AND FORMOTEROL FUMARATE 160; 9; 4.8 UG/1; UG/1; UG/1
2 AEROSOL, METERED RESPIRATORY (INHALATION) 2 TIMES DAILY
Qty: 1 EACH | Refills: 5 | Status: SHIPPED | OUTPATIENT
Start: 2024-04-05

## 2024-04-05 NOTE — PROGRESS NOTES
desaturations.  However as patient was told that he stops breathing with significant deviated nasal septum and other related I decided to go ahead and refer the patient to the sleep evaluation which the patient agreed to do so.  Orders Placed This Encounter   Procedures    Alpha-1-Antitrypsin     Standing Status:   Future     Standing Expiration Date:   4/5/2025    [facility]     Referral Priority:   Routine     Referral Type:   Eval and Treat     Referral Reason:   Specialty Services Required     Requested Specialty:   Sleep Center     Number of Visits Requested:   1    6 Minute Walk Test    DME Order for Home Oxygen as OP     You must complete the order parameters below and add the medical necessity documentation for this DME in a separate note.    Stationary Oxygen Concentrator at 2 lpm via Nasal Cannula    Stationary Prescribed at:  Continuous while sleeping    Portable Gaseous O2 System and contents at 2 lpm via Nasal Cannula    Non Applicable    Diagnosis: COPD/NocturnalHypoxemia/Smoker/Possible sleep apnea  Length of need: Lifetime   He is scheduled for the pulmonary function testing which is not available.  We will check when he is going to get it and I recommend the patient to be seen after the test done to change and work for his disability are about his going back to the work and other related.  I gave him the Breztri with spacer 2 samples and also instructions was given and the prescription was written.    -Reviewed preventative vaccinations    Advised patient to call office with any changes, questions, or concerns regarding respiratory status or issues with prescribed medications    Return in about 2 months (around 6/5/2024).       Electronically signed by Hermann Ferreira MD on 4/5/2024 at 10:43 AM     **This report has been created using voice recognition software. It may contain minor errors which are inherent in voice recognition technology.**

## 2024-04-05 NOTE — TELEPHONE ENCOUNTER
Pt was here today and seen by Dr Ferreira.  The O2 order has portable on it and is for nocturnal only.  Could you please redo the order in his absence. He had already left for the day.  Thank you.

## 2024-04-05 NOTE — CARE COORDINATION
Attempted to reach patient for continued Care Coordination follow up and education.  Patient was unavailable at the time of my call, and a generic voicemail message was left asking patient to return my call at [phone].

## 2024-04-05 NOTE — CARE COORDINATION
Per Edel Mejía RDN dietician education mailed to patient.           Latanya Ann [facility]  CC   Medication Assistance  [employer]indy Loja [place] [employer]    (P) [phone]  (F) [phone]

## 2024-04-08 ENCOUNTER — CARE COORDINATION (OUTPATIENT)
Dept: CARE COORDINATION | Age: 49
End: 2024-04-08

## 2024-04-08 NOTE — CARE COORDINATION
Alex Martell  4/8/2024    Registered Dietitian Progress Note for Care Coordination    Assessment: Alex is a 48 y.o. male.  RD referred for New Diagnosis of CHF and Unintentional Weight Loss. RD spoke with patient for initial nutrition assessment on 3/21/24. RD called to follow up with pt today 4/8/24. RD discussed previous goals with pt. Patient states he has not yet received the handouts and coupons in the mail. Per chart review, [facility] mailed handouts and coupons on 4/5/24. RD reiterated the importance of eating balanced meals and following a low sodium diet. Patient states he is eating 3 meals/day, no examples of meals provided per patient. RD reviewed the components of a balanced meal using MyPlate. Reiterated the importance of incorporating a protein source to each meal, reviewed protein sources. Discussed the importance of supplementing with ONS daily to help better meet estimated nutrition needs daily and prevent further weight loss. Patient states he is drinking Ensure BID. Patient states his CBW is 114 lb, no further weight loss reported per patient. Per chart review, patient had OV with Cardiology on 3/25/24 and OV with CHF Clinic on 4/1/24. Patient has no nutrition related questions or concerns at this time.     Barriers to meeting goals: overwhelmed by complexity of regimen and lack of education      Nutrition Monitoring and Evaluation  Indicator/Goal Criteria Progress   #1 Eat balanced meals consistently throughout the day. Supplement with Ensure at least once a day.  #1 Focus on eating 3 meals/day and make these meals balanced using the MyPlate. Supplement with Ensure BID.  #1 Patient is eating 3 meals/day and drinking Ensure BID. Patient will incorporate a protein source to each meal.   #2  Monitor daily sodium intake. Keep sodium from food and beverages to no more than 2000 mg/day.  #2 Avoid the salt shaker. Read food labels to help choose lower sodium options. Watch portion sizes.  #2 Patient

## 2024-04-09 ENCOUNTER — TELEPHONE (OUTPATIENT)
Dept: PULMONOLOGY | Age: 49
End: 2024-04-09

## 2024-04-09 DIAGNOSIS — J44.1 CHRONIC OBSTRUCTIVE PULMONARY DISEASE WITH ACUTE EXACERBATION (HCC): Primary | ICD-10-CM

## 2024-04-09 RX ORDER — BUDESONIDE AND FORMOTEROL FUMARATE DIHYDRATE 160; 4.5 UG/1; UG/1
2 AEROSOL RESPIRATORY (INHALATION) 2 TIMES DAILY
Qty: 1 EACH | Refills: 5 | Status: SHIPPED | OUTPATIENT
Start: 2024-04-09

## 2024-04-09 NOTE — TELEPHONE ENCOUNTER
[facility] called regarding this pts RX for Breztri.  They stated that with the patient assistance program he is on it will not cover the Breztri, it will only cover Symbicort. Please advise, thank you.

## 2024-04-12 NOTE — TELEPHONE ENCOUNTER
Patient wasn't sure if he needed to continue with the Eliquis.  I know it may just need to be Eliquis 5 mg.

## 2024-04-15 ENCOUNTER — TELEPHONE (OUTPATIENT)
Dept: CARDIOLOGY CLINIC | Age: 49
End: 2024-04-15

## 2024-04-15 NOTE — TELEPHONE ENCOUNTER
Edel called back, Xarelto and Eliquis are not included under [facility].  She states she sees where patient is in the process of applying for Medicaid, she states both Eliquis and Xarelto are covered under Medicaid.  Otherwise, patient will need to apply for patient assistance. She states Xarelto assistance may be easier for patient obtain as [employer] has been very strict with their assistance program this year.    Call to patient, he states his Medicaid status is still pending. Explained the options to him. He will see if any updates on his Medicaid this week. Instructed him to call the office back next week and let us know how he would like to proceed.    He will be picking up 2 weeks worth of samples.

## 2024-04-15 NOTE — TELEPHONE ENCOUNTER
Pt calling, States he gets his meds through outpatient pharmacy, [facility]. Asking if there is something other than Eliquis that he could take that he could get through that program.  Called down to Edel [phone]  Had to LM. Asking if any alternatives to Eliquis are part of that program? If so, will need to ask Dr Negron if patient can switch.   2 weeks of Eliquis samples provided in the meantime.

## 2024-04-16 ENCOUNTER — CARE COORDINATION (OUTPATIENT)
Dept: CARE COORDINATION | Age: 49
End: 2024-04-16

## 2024-04-16 ASSESSMENT — ENCOUNTER SYMPTOMS: DYSPNEA ASSOCIATED WITH: EXERTION

## 2024-04-16 NOTE — CARE COORDINATION
Ambulatory Care Coordination Note  2024    Patient Current Location:  Home: [address]  [address] [facility] [address]     ACM contacted the patient by telephone. Verified name and  with patient as identifiers. Provided introduction to self, and explanation of the ACM role.     Challenges to be reviewed by the provider   Additional needs identified to be addressed with provider: Yes  Work:   Has financial concerns d/t being off work since his hospitalization. 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  Asking if he can return back to work at this time.  Please advise.                Method of communication with provider: chart routing    ACM: Gayathri Alva, ADITHYA       Alex was referred to care coordination by PCP for education and assistance in managing his chronic conditions and assisting with resource needs.   Pt recently admitted to hospital with sara PE, CHF.    Spoke with Alex today for f/u.    Discussed Medicaid-pt reports that he received letter stating that he and wife have been denied as they are over the income eligibility requirements.    : pt seen by PULM. Home oxygen ordered to wear at .  Sleep study was ordered.  Has not been scheduled at this time.  Pt does not have to wear oxygen t/o day. Pt was started on Symbicort.  Taking 2 puffs daily.  PFT's . Has f/u in .    Denies SOB at rest.  Gets  MCRAE. Has alpha 1 antitrypsin level completed. Results still pending.   CHF: following with HF clinic.  Had recent lab work.  Labs stable. No changes in medications. Now has scales that he is monitoring wts daily.  Reports wts have been stable at 114#.  Denies edema.  Limiting sodium in diet.   Pt was provided with Eliquis samples.    Drinking nutritional drinks twice daily.   Pt asking when he can return to work.  Has financial concerns d/t being off work since his hospitalization. 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 Pt unsure if he will

## 2024-04-16 NOTE — CARE COORDINATION
I spoke with the patient and we are going to start the application process on his Eliquis. I faxed the provider his portion and will mail the patients his soon.          Latanya Ann [facility]  CC   Medication Assistance  Cindy Loja [place] [employer]    (P) [phone]  (F) [phone]

## 2024-04-17 ENCOUNTER — CARE COORDINATION (OUTPATIENT)
Dept: CARE COORDINATION | Age: 49
End: 2024-04-17

## 2024-04-17 NOTE — TELEPHONE ENCOUNTER
Patient was notified and verbalized his understanding. He stated he had not heard from cariology yet though so I advised him to call their office as we could see that they had responded.

## 2024-04-17 NOTE — TELEPHONE ENCOUNTER
Contacted pt.  Informed him that cardiology has cleared him to return back to work.  Advised to contact cardiology if he is not tolerating.  Pt verbalizes understanding.

## 2024-04-17 NOTE — TELEPHONE ENCOUNTER
Has financial concerns d/t being off work since his hospitalization. 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 Pt was cleared by cardiology to return back to work.  Pt is asking if he is able to try returning back to work prior to his pulmonary function testing or does he need to wait for that to be completed.  Testing is scheduled for 5/22.  Was recently seen by Dr. Ferreira.  On home oxygen 2 liters at  only. Please advise.

## 2024-04-23 ENCOUNTER — CARE COORDINATION (OUTPATIENT)
Dept: CARE COORDINATION | Age: 49
End: 2024-04-23

## 2024-04-23 NOTE — CARE COORDINATION
Pt left message requesting return call regarding questions.   Contacted pt back. He states that it was about medication assistance so he was able to talk with Latanya Ann regarding paperwork that needs to be submitted.    Reports that he is doing ok.  Plan is for him to return to work 5/1/24.  Advised pt to call if unable to tolerate work once he returns.

## 2024-04-23 NOTE — CARE COORDINATION
Patient called with questions on what proof of income to send back to me since he is currently not working. He is just going to send me their 1099 from taxes. Once I get this information I will submit his application into the .        Latanya Ann [facility]  CC   Medication Assistance  Cindy Loja [place] [employer]    (P) [phone]  (F) [phone]

## 2024-04-26 NOTE — TELEPHONE ENCOUNTER
Pt is calling and is wanting to get some samples of the Eliquis that he is taking.  He is coming to the office to bring paperwork later this morning.  Please advise pt at [phone]

## 2024-04-29 ENCOUNTER — CARE COORDINATION (OUTPATIENT)
Dept: CARE COORDINATION | Age: 49
End: 2024-04-29

## 2024-04-29 NOTE — CARE COORDINATION
of sudden weight gain.  #3 Patient is monitoring daily weights. Patient states his CBW is 113.8 lbs.          Plan of Care:  RD encouraged pt to keep working toward goals set. RD will follow up with pt to discuss any questions pt has and check the progress toward goals.     Follow Up:    RD will call pt in 3 weeks to follow up and answer any nutrition related questions at that time.     Edel Mejía RDN, LD  [phone]

## 2024-05-03 ENCOUNTER — CARE COORDINATION (OUTPATIENT)
Dept: CARE COORDINATION | Age: 49
End: 2024-05-03

## 2024-05-03 NOTE — CARE COORDINATION
Ambulatory Care Coordination Note  5/3/2024    Patient Current Location:  Home:  [address] [facility] [address]     ACM contacted the patient by telephone. Verified name and  with patient as identifiers. Provided introduction to self, and explanation of the ACM role.     Challenges to be reviewed by the provider   Additional needs identified to be addressed with provider: No  none               Method of communication with provider: none.    ACM: Gayathri Alva RN    Alex was referred to care coordination by PCP for education and assistance in managing his chronic conditions and assisting with resource needs.   Pt recently admitted to hospital with anasarca, PE, CHF.    Spoke with Alex today for f/u.    Pt returned to work this wk. Reports that so far he has been tolerating work.  Has noticed some abd bloating.  Denies edema in ble.  Denies wt gain.  Denies constipation.  Advised to monitor and if worsens to call.  Pt is following 2gm low sodium diet and fluid restriction.   Reports that he is only working 2-3 days per wk at this time.    Scheduled for pulmonary testing . F/u with pulm   Drinking Ensure BID.  Working with central dietician.   Heart Failure Education outreach Date/Time: 5/3/2024 2:57 PM    Ambulatory Care Manager (ACM) contacted the patient by telephone to perform Ambulatory Care Coordination. Verified name and  with patient as identifiers. Provided introduction to self, and explanation of the Ambulatory Care Manager's role.     ACM reviewed that a Heart Healthy tips for the Summer packet has been mailed to the them. ACM reviewed CHF zones, daily weights, fluid restriction, the importance of low sodium diet, and healthy tips packet with the patient. Instructed patient to call their Cardiologist if they have a weight gain of 3 lbs in 2 days or 5 lbs in a week.     Patient reminded that there is a physician on call 24 hours a day / 7 days a week should the patient have

## 2024-05-13 ENCOUNTER — CARE COORDINATION (OUTPATIENT)
Dept: CARE COORDINATION | Age: 49
End: 2024-05-13

## 2024-05-13 RX ORDER — BUMETANIDE 1 MG/1
1 TABLET ORAL DAILY
Qty: 30 TABLET | Refills: 1 | Status: SHIPPED | OUTPATIENT
Start: 2024-05-13

## 2024-05-13 ASSESSMENT — ENCOUNTER SYMPTOMS: DYSPNEA ASSOCIATED WITH: EXERTION

## 2024-05-13 NOTE — CARE COORDINATION
Ambulatory Care Coordination Note  2024    Patient Current Location:  [place]    ACM contacted the patient by telephone. Verified name and  with patient as identifiers. Provided introduction to self, and explanation of the ACM role.     Challenges to be reviewed by the provider   Additional needs identified to be addressed with provider: No  none               Method of communication with provider: none.    ACM: Gayathri Alva RN    Alex was referred to care coordination by PCP for education and assistance in managing his chronic conditions and assisting with resource needs.   Pt recently admitted to hospital with anasarca, PE, CHF.   Spoke with Alex today for f/u.   Pt needing new Bumex script as he is down to 1 wk left on medication.  Refill request sent to CHF clinic. Following with clinic.  Has appt in .  Pt has returned back to work 3 days per wk.  Tolerating work.  Fatigued and a little more SOB at end of day but denies edema to EXT's.  Reports wts have been stable.  Following low sodium diet and fluid restriction.   Pt has upcoming pulmonary testing on .  He will f/u with PULM on .  Uses albuterol inhaler PRN.  Gets SOB at times and has an occasional cough.  Wearing oxygen at HS.   Continues to drink nutritional drinks  Working on smoking cessation.     Plan of care:  Message sent to med USMD re: Eliquis.    Medication refill request sent to CHF clinic on Bumex  Pt to continue daily wt monitoring  Encouraged to continue following low sodium diet and fluid restriction.   Complete pulmonary testing  and f/u with PULM .    Congestive Heart Failure Assessment    Do you understand a low sodium diet?: Yes  Do you understand how to read food labels?: Yes  How many restaurant meals do you eat per week?: 0         Symptoms:  CHF associated angina: Neg, CHF associated dyspnea on exertion: Pos, CHF associated fatigue: Pos, CHF associated leg swelling: Neg, CHF associated orthostatic

## 2024-05-13 NOTE — TELEPHONE ENCOUNTER
Pt called stating that he is down to 1 wk supply of his Bumex.  Pt states that he is doing well on the medications.  Wts have remained stable. Please advise

## 2024-05-13 NOTE — CARE COORDINATION
I was able to submit the patients application for assistance through [employer] for his Eliquis.          Latanya Ann [facility]  CC   Medication Assistance  [employer] [place] [employer]    P) [phone]  (F) [phone]

## 2024-05-17 ENCOUNTER — CARE COORDINATION (OUTPATIENT)
Dept: CARE COORDINATION | Age: 49
End: 2024-05-17

## 2024-05-17 NOTE — CARE COORDINATION
Spoke with Jt Ojeda and I was able to get the patient approved into the PAP program for his Eliquis. I called to let the patient know he is good for the next year.        Latanya Ann [facility]  CC   Medication Assistance  [place]    (P) [phone]  (F) [phone]

## 2024-05-20 ENCOUNTER — CARE COORDINATION (OUTPATIENT)
Dept: CARE COORDINATION | Age: 49
End: 2024-05-20

## 2024-05-20 NOTE — CARE COORDINATION
Alex Martell  5/20/2024    Registered Dietitian Progress Note for Care Coordination    Assessment: Alex is a 48 y.o. male. RD referred for New Diagnosis of CHF and Unintentional Weight Loss. RD spoke with patient for initial nutrition assessment on 3/21/24 and has been following up with patient. RD called to follow up with pt today 5/20/24. RD discussed previous goals with pt. RD reiterated the importance of eating 3 meals/day and following a low sodium diet closely. Patient states he is eating 3 meals/day, no examples of meals provided per patient. Reviewed the components of a balanced meal using MyPlate. Reiterated the importance of supplementing with ONS daily to help better meet estimated nutrition needs and prevent further weight loss. Patient states he is drinking Ensure BID. Patient is monitoring daily weights and states his weight is staying between 114-115 lbs. No further weight loss reported per patient. Patient has no nutrition related questions or concerns at this time.     Barriers to meeting goals: overwhelmed by complexity of regimen and lack of education        Nutrition Monitoring and Evaluation  Indicator/Goal Criteria Progress   #1 Eat balanced meals consistently throughout the day. Supplement with Ensure at least once a day.  #1 Focus on eating 3 meals/day and make these meals balanced using the MyPlate. Supplement with Ensure BID.  #1 Patient is eating 3 meals/day and drinking Ensure BID. Patient will make meals balanced using MyPlate.    #2  Monitor daily sodium intake. Keep sodium from food and beverages to no more than 2000 mg/day.  #2 Avoid the salt shaker. Read food labels to help choose lower sodium options. Watch portion sizes.  #2 Patient is following a low sodium diet and following with CHF Clinic.    #3  Monitor daily weights and keep a log.  #3 Start weighing self daily and notify MD of sudden weight gain.  #3 Patient is monitoring daily weights. Patient states his weight is

## 2024-05-22 ENCOUNTER — HOSPITAL ENCOUNTER (OUTPATIENT)
Dept: PULMONOLOGY | Age: 49
Discharge: HOME | End: 2024-05-22

## 2024-05-22 DIAGNOSIS — D75.1 POLYCYTHEMIA: ICD-10-CM

## 2024-05-22 DIAGNOSIS — I26.93 SINGLE SUBSEGMENTAL PULMONARY EMBOLISM WITHOUT ACUTE COR PULMONALE (HCC): ICD-10-CM

## 2024-05-22 DIAGNOSIS — J44.1 CHRONIC OBSTRUCTIVE PULMONARY DISEASE WITH ACUTE EXACERBATION (HCC): ICD-10-CM

## 2024-05-22 DIAGNOSIS — Z83.49 FAMILY HISTORY OF ALPHA 1 ANTITRYPSIN DEFICIENCY: ICD-10-CM

## 2024-05-22 PROCEDURE — 94060 EVALUATION OF WHEEZING: CPT

## 2024-05-22 PROCEDURE — 94726 PLETHYSMOGRAPHY LUNG VOLUMES: CPT

## 2024-05-22 PROCEDURE — 94729 DIFFUSING CAPACITY: CPT

## 2024-05-23 ENCOUNTER — CARE COORDINATION (OUTPATIENT)
Dept: CARE COORDINATION | Age: 49
End: 2024-05-23

## 2024-05-23 NOTE — CARE COORDINATION
Attempted to reach patient for continued Care Coordination follow up and education.  Patient was unavailable at the time of my call, and states all circuits are busy.

## 2024-05-28 ENCOUNTER — OFFICE VISIT (OUTPATIENT)
Dept: PULMONOLOGY | Age: 49
End: 2024-05-28
Payer: MEDICAID

## 2024-05-28 VITALS
HEART RATE: 78 BPM | DIASTOLIC BLOOD PRESSURE: 74 MMHG | BODY MASS INDEX: 21.42 KG/M2 | WEIGHT: 116.4 LBS | SYSTOLIC BLOOD PRESSURE: 116 MMHG | OXYGEN SATURATION: 100 % | HEIGHT: 62 IN | TEMPERATURE: 99.1 F

## 2024-05-28 DIAGNOSIS — J44.9 STAGE 4 VERY SEVERE COPD BY GOLD CLASSIFICATION (HCC): Primary | ICD-10-CM

## 2024-05-28 DIAGNOSIS — Z14.8 ALPHA-1-ANTITRYPSIN DEFICIENCY CARRIER: ICD-10-CM

## 2024-05-28 DIAGNOSIS — R06.83 SNORING: ICD-10-CM

## 2024-05-28 DIAGNOSIS — Z72.0 TOBACCO ABUSE DISORDER: ICD-10-CM

## 2024-05-28 DIAGNOSIS — R06.81 WITNESSED EPISODE OF APNEA: ICD-10-CM

## 2024-05-28 PROCEDURE — 99214 OFFICE O/P EST MOD 30 MIN: CPT

## 2024-05-28 PROCEDURE — 99406 BEHAV CHNG SMOKING 3-10 MIN: CPT

## 2024-05-28 RX ORDER — BUDESONIDE, GLYCOPYRROLATE, AND FORMOTEROL FUMARATE 160; 9; 4.8 UG/1; UG/1; UG/1
AEROSOL, METERED RESPIRATORY (INHALATION)
COMMUNITY
End: 2024-05-28

## 2024-05-28 RX ORDER — ALBUTEROL SULFATE 90 UG/1
2 AEROSOL, METERED RESPIRATORY (INHALATION) EVERY 6 HOURS PRN
Qty: 2 EACH | Refills: 5 | Status: SHIPPED | OUTPATIENT
Start: 2024-05-28

## 2024-05-28 RX ORDER — BUDESONIDE, GLYCOPYRROLATE, AND FORMOTEROL FUMARATE 160; 9; 4.8 UG/1; UG/1; UG/1
AEROSOL, METERED RESPIRATORY (INHALATION)
Status: CANCELLED | OUTPATIENT
Start: 2024-05-28

## 2024-05-28 RX ORDER — NICOTINE 21 MG/24HR
1 PATCH, TRANSDERMAL 24 HOURS TRANSDERMAL DAILY
Qty: 30 PATCH | Refills: 3 | Status: SHIPPED | OUTPATIENT
Start: 2024-05-28

## 2024-05-28 ASSESSMENT — ENCOUNTER SYMPTOMS
RHINORRHEA: 0
COUGH: 1
SINUS PAIN: 0
WHEEZING: 1
CHEST TIGHTNESS: 1
SHORTNESS OF BREATH: 1
SINUS PRESSURE: 0

## 2024-05-28 NOTE — PROGRESS NOTES
Alcohol/week: 4.0 standard drinks of alcohol     Types: 4 Cans of beer per week    Drug use: Yes     Frequency: 4.0 times per week     Types: Marijuana (Weed)     Comment: used to smoke, now uses edibles.     ALLERGIES:No Known Allergies  FAMILY HISTORY:History reviewed. No pertinent family history.  CURRENT MEDICATIONS:  Current Outpatient Medications   Medication Sig Dispense Refill    nicotine (NICODERM CQ) 21 MG/24HR Place 1 patch onto the skin daily 30 patch 3    albuterol sulfate HFA (VENTOLIN HFA) 108 (90 Base) MCG/ACT inhaler Inhale 2 puffs into the lungs every 6 hours as needed for Wheezing or Shortness of Breath 2 each 5    bumetanide (BUMEX) 1 MG tablet Take 1 tablet by mouth daily 30 tablet 1    apixaban (ELIQUIS) 5 MG TABS tablet Take 1 tablet by mouth 2 times daily 42 tablet 0    apixaban (ELIQUIS) 5 MG TABS tablet Take 1 tablet by mouth 2 times daily 180 tablet 1    budesonide-formoterol (SYMBICORT) 160-4.5 MCG/ACT AERO Inhale 2 puffs into the lungs 2 times daily 1 each 5    albuterol (PROVENTIL) (2.5 MG/3ML) 0.083% nebulizer solution Take 3 mLs by nebulization every 6 hours as needed for Wheezing 360 mL 3    albuterol (ACCUNEB) 1.25 MG/3ML nebulizer solution Inhale 3 mLs into the lungs every 6 hours as needed for Wheezing 360 mL 3    Respiratory Therapy Supplies (NEBULIZER/TUBING/MOUTHPIECE) KIT 1 kit by Does not apply route daily as needed (breathing treatments) 2 kit 0    aspirin 81 MG chewable tablet Take 1 tablet by mouth daily      Misc. Devices (DIGITAL GLASS SCALE) MISC 1 Device by Does not apply route once for 1 dose For daily weights. (Patient not taking: Reported on 3/25/2024) 1 each 0     No current facility-administered medications for this visit.     .    ROS   Review of Systems   Constitutional:  Negative for appetite change, chills, diaphoresis, fever and unexpected weight change.   HENT:  Negative for congestion, postnasal drip, rhinorrhea, sinus pressure, sinus pain and sneezing.

## 2024-05-30 ENCOUNTER — CARE COORDINATION (OUTPATIENT)
Dept: CARE COORDINATION | Age: 49
End: 2024-05-30

## 2024-05-30 ENCOUNTER — NURSE ONLY (OUTPATIENT)
Dept: LAB | Age: 49
End: 2024-05-30

## 2024-05-30 DIAGNOSIS — Z14.8 ALPHA-1-ANTITRYPSIN DEFICIENCY CARRIER: ICD-10-CM

## 2024-05-30 ASSESSMENT — ENCOUNTER SYMPTOMS: DYSPNEA ASSOCIATED WITH: MINIMAL EXERTION

## 2024-05-30 NOTE — CARE COORDINATION
a low sodium diet?: Yes  Do you understand how to read food labels?: Yes  How many restaurant meals do you eat per week?: 0  Do you salt your food before tasting it?: No         Symptoms:  CHF associated angina: Neg, CHF associated dyspnea on exertion: Pos, CHF associated fatigue: Pos, CHF associated leg swelling: Neg, CHF associated orthostatic hypotension: Neg, CHF associated PND: Neg, CHF associated shortness of breath: Neg, CHF associated weakness: Neg      Symptom course: stable  Patient-reported weight (lb): 114.9  Weight trend: stable  Salt intake watch compared to last visit: stable      and   COPD Assessment    Does the patient understand envrionmental exposure?: Yes  Is the patient able to verbalize Rescue vs. Long Acting medications?: Yes  Does the patient have a nebulizer?: Yes  Does the patient use a space with inhaled medications?: No            Symptoms:  None: Yes      Symptom course: stable  Breathlessness: minimal exertion  Change in chronic cough?: No/At Baseline  Change in sputum?: No/At Baseline  Self Monitoring - SaO2: No  Have you had a recent diagnosis of pneumonia either by PCP or at a hospital?: No           Offered patient enrollment in the Remote Patient Monitoring (RPM) program for in-home monitoring: Patient is not eligible for RPM program because: insurance coverage.    Lab Results       None            Care Coordination Interventions    Referral from Primary Care Provider: Yes  Suggested Interventions and Community Resources  Registered Dietician: Completed (Comment: following with central dietician)  Zone Management Tools: Completed          Goals Addressed                   This Visit's Progress     Conditions and Symptoms   On track     I will schedule office visits, as directed by my provider.  I will keep my appointment or reschedule if I have to cancel.  I will notify my provider of any barriers to my plan of care.  I will follow my Zone Management tool to seek urgent or

## 2024-05-31 ENCOUNTER — TELEPHONE (OUTPATIENT)
Dept: PULMONOLOGY | Age: 49
End: 2024-05-31

## 2024-05-31 LAB — A1AT SERPL-MCNC: 124 MG/DL (ref 90–200)

## 2024-05-31 NOTE — RESULT ENCOUNTER NOTE
Armando Dugan, APRN - CNP  [facility] Clinical Staff  Please let patient know his A1A level was normal and does not require any of the infusion treatments we discussed. Thank you!    --patient was notified and verbalized his understanding

## 2024-05-31 NOTE — TELEPHONE ENCOUNTER
----- Message from DRAKE Dennis CNP sent at 5/31/2024  8:11 AM EDT -----  Please let patient know his A1A level was normal and does not require any of the infusion treatments we discussed. Thank you!

## 2024-06-10 ENCOUNTER — CARE COORDINATION (OUTPATIENT)
Dept: CARE COORDINATION | Age: 49
End: 2024-06-10

## 2024-06-10 ASSESSMENT — ENCOUNTER SYMPTOMS: DYSPNEA ASSOCIATED WITH: EXERTION

## 2024-06-10 NOTE — CARE COORDINATION
Ambulatory Care Coordination Note  6/10/2024    Patient Current Location:  [place]     ACM contacted the patient by telephone. Verified name and  with patient as identifiers. Provided introduction to self, and explanation of the ACM role.     Challenges to be reviewed by the provider   Additional needs identified to be addressed with provider: No  none               Method of communication with provider: none.    ACM: Gayathri Alva RN       Alex was referred to care coordination by PCP for education and assistance in managing his chronic conditions and assisting with resource needs.   Pt recently admitted to hospital with anasarca, PE, CHF, COPD  Spoke with Alex today for care coordination f/u.    Pt at work at present time.   Pt reports that he is doing ok.    Pt has resource info. For disability office.  Pt aware that he needs to find new PCP.  Has been provided with number to get established with new [facility] provider.  Pt also has this [facility]'s number to reach out with new questions or new resource info.   COPD: following with PULM.  Pt had additional Alpha 1 testing completed which was negative. Pt was notified by PULM and made aware that he will not have to get any infusion tx's previously discussed.  Pt has noticed the more strenuous activity he does or the hotter and more humid the weather is the easier he becomes SOB.  Has albuterol that he uses PRN.  Reports that it does help.    Pt aware of importance of early symptom recognition and reporting.   CHF: monitoring wts daily. Following low sodium diet.  Following with CHF clinic.  Has appt   On Bumex.  Pt getting low on currently supply.  Call placed to [facility] outpt pharmacy. They will refill Bumex.    Plan of  care:  Pt aware of resources  Aware of importance of getting connected with new PCP  Continue using [facility] for medications while w/out insurance  Close f/u with specialists. CHF clinic  and PULM   Continue working on

## 2024-06-20 ENCOUNTER — CARE COORDINATION (OUTPATIENT)
Dept: CARE COORDINATION | Age: 49
End: 2024-06-20

## 2024-06-20 NOTE — CARE COORDINATION
Alex Martell  6/20/2024    Registered Dietitian Progress Note for Care Coordination    Assessment: Alex is a 48 y.o. male. RD referred for New Diagnosis of CHF and Unintentional Weight Loss. RD spoke with patient for initial nutrition assessment on 3/21/24 and has been following up with patient. RD called to follow up with pt today 6/20/24. RD discussed previous goals with pt. Patient states he is eating 3 meals/day. Patient states today for breakfast he ate a bowl of cereal and for lunch he ate a turkey sandwich. RD reviewed the components of a balanced meal using MyPlate. Discussed the importance of incorporating a variety of fruits, vegetables, whole grains, lean protein and low fat dairy. Reiterated the importance of incorporating a protein source to each meal- reviewed protein sources. Reiterated the importance of following a low sodium diet closely and limiting sodium from food and beverages to no more than 2000 mg per day. Per chart review, patient has OV with CHF Clinic on 6/25/24. RD reviewed the importance of supplementing with ONS daily to help better meet estimated nutrition needs and prevent further weight loss. Patient states he is drinking Ensure BID. RD will mail additional Ensure coupons to patient. Per chart review, patient's weight documented as 116 lb as of 5/28/24. No further weight loss reported per patient. Patient has no nutrition related questions or concerns at this time.       Nutrition Monitoring and Evaluation  Indicator/Goal Criteria Progress   #1 Eat balanced meals consistently throughout the day. Supplement with Ensure at least once a day.  #1 Focus on eating 3 meals/day and make these meals balanced using the MyPlate. Supplement with Ensure BID.  #1 Patient is eating 3 meals/day and drinking Ensure BID. Patient will make meals balanced using MyPlate.    #2  Monitor daily sodium intake. Keep sodium from food and beverages to no more than 2000 mg/day.  #2 Avoid the salt shaker.

## 2024-06-25 ENCOUNTER — CARE COORDINATION (OUTPATIENT)
Dept: CARE COORDINATION | Age: 49
End: 2024-06-25

## 2024-06-25 ENCOUNTER — OFFICE VISIT (OUTPATIENT)
Dept: CARDIOLOGY CLINIC | Age: 49
End: 2024-06-25

## 2024-06-25 VITALS
BODY MASS INDEX: 21.53 KG/M2 | WEIGHT: 117 LBS | OXYGEN SATURATION: 93 % | DIASTOLIC BLOOD PRESSURE: 60 MMHG | HEART RATE: 77 BPM | HEIGHT: 62 IN | SYSTOLIC BLOOD PRESSURE: 92 MMHG

## 2024-06-25 DIAGNOSIS — Z91.89 AT RISK FOR FLUID VOLUME OVERLOAD: ICD-10-CM

## 2024-06-25 DIAGNOSIS — I50.32 CHRONIC DIASTOLIC CONGESTIVE HEART FAILURE, NYHA CLASS 2 (HCC): Primary | ICD-10-CM

## 2024-06-25 DIAGNOSIS — I50.813 ACUTE ON CHRONIC RIGHT-SIDED HEART FAILURE (HCC): ICD-10-CM

## 2024-06-25 PROCEDURE — 99214 OFFICE O/P EST MOD 30 MIN: CPT | Performed by: NURSE PRACTITIONER

## 2024-06-25 RX ORDER — BUDESONIDE AND FORMOTEROL FUMARATE DIHYDRATE 80; 4.5 UG/1; UG/1
2 AEROSOL RESPIRATORY (INHALATION) 2 TIMES DAILY
COMMUNITY

## 2024-06-25 ASSESSMENT — ENCOUNTER SYMPTOMS
SHORTNESS OF BREATH: 1
COUGH: 0
VOMITING: 0
NAUSEA: 0
ABDOMINAL DISTENTION: 0

## 2024-06-25 NOTE — CARE COORDINATION
Mailed the patient Ensure coupons per Edel Mejía RDN.        Latanya Ann [facility]  CC   Medication Assistance  [employer] [place] and [employer]    (P) [phone]  (F) [phone]
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
## 2024-06-25 NOTE — PATIENT INSTRUCTIONS
You may receive a survey regarding the care you received during your visit.  Your input is valuable to us.  We encourage you to complete and return your survey.  We hope you will choose us in the future for your healthcare needs.    Your nurses today were Mihir.  Office hours:   Mon-Thurs 8-4:30  Friday 8-12  Phone: [phone]    Continue:  Continue current medications  Daily weights and record  Fluid restriction of 2 Liters per day  Limit sodium in diet to around 8906-0019 mg/day  Monitor BP  Activity as tolerated     Call the Heart Failure Clinic for any of the following symptoms:   Weight gain of -3 pounds in 1 day or 5 pounds in 1 week  Increased shortness of breath  Shortness of breath while laying down  Cough  Chest pain  Swelling in feet, ankles or legs  Bloating in abdomen  Fatigue

## 2024-07-08 NOTE — PROGRESS NOTES
Comment: RA  BMI 21.73 kg/m²    Wt Readings from Last 3 Encounters:   07/09/24 53.9 kg (118 lb 12.8 oz)   06/25/24 53.1 kg (117 lb)   05/28/24 52.8 kg (116 lb 6.4 oz)       Physical Exam  Vitals reviewed.   Constitutional:       General: He is not in acute distress.     Appearance: Normal appearance.   HENT:      Head: Normocephalic and atraumatic.      Right Ear: External ear normal.      Left Ear: External ear normal.      Mouth/Throat:      Mouth: Mucous membranes are moist.      Pharynx: No oropharyngeal exudate or posterior oropharyngeal erythema.   Eyes:      General:         Right eye: No discharge.         Left eye: No discharge.   Cardiovascular:      Rate and Rhythm: Normal rate and regular rhythm.      Heart sounds: Normal heart sounds.   Pulmonary:      Effort: Pulmonary effort is normal. No respiratory distress.      Breath sounds: Wheezing and rhonchi present. No rales.   Chest:      Chest wall: No tenderness.   Musculoskeletal:      Cervical back: Neck supple.      Right lower leg: No edema.      Left lower leg: No edema.   Skin:     General: Skin is warm and dry.   Neurological:      General: No focal deficit present.      Mental Status: He is alert.   Psychiatric:         Mood and Affect: Mood normal.         Behavior: Behavior normal.         Thought Content: Thought content normal.            Electronically signed by DRAKE Dennis CNP on 7/9/2024 at 9:25 AM     (Please note that portions of this note may have been completed with a voice recognition program. Efforts were made to edit the dictation but occasionally words are mis-transcribed)

## 2024-07-09 ENCOUNTER — TELEPHONE (OUTPATIENT)
Dept: PULMONOLOGY | Age: 49
End: 2024-07-09

## 2024-07-09 ENCOUNTER — OFFICE VISIT (OUTPATIENT)
Dept: PULMONOLOGY | Age: 49
End: 2024-07-09

## 2024-07-09 VITALS
TEMPERATURE: 98.9 F | WEIGHT: 118.8 LBS | DIASTOLIC BLOOD PRESSURE: 60 MMHG | SYSTOLIC BLOOD PRESSURE: 102 MMHG | HEIGHT: 62 IN | BODY MASS INDEX: 21.86 KG/M2 | HEART RATE: 69 BPM | OXYGEN SATURATION: 92 %

## 2024-07-09 DIAGNOSIS — Z72.0 TOBACCO ABUSE DISORDER: ICD-10-CM

## 2024-07-09 DIAGNOSIS — J44.9 STAGE 4 VERY SEVERE COPD BY GOLD CLASSIFICATION (HCC): ICD-10-CM

## 2024-07-09 DIAGNOSIS — Z14.8 ALPHA-1-ANTITRYPSIN DEFICIENCY CARRIER: ICD-10-CM

## 2024-07-09 DIAGNOSIS — I50.32 CHRONIC HEART FAILURE WITH PRESERVED EJECTION FRACTION (HCC): ICD-10-CM

## 2024-07-09 DIAGNOSIS — J44.9 STAGE 4 VERY SEVERE COPD BY GOLD CLASSIFICATION (HCC): Primary | ICD-10-CM

## 2024-07-09 PROCEDURE — 99214 OFFICE O/P EST MOD 30 MIN: CPT

## 2024-07-09 RX ORDER — FLUTICASONE PROPIONATE AND SALMETEROL 250; 50 UG/1; UG/1
1 POWDER RESPIRATORY (INHALATION) EVERY 12 HOURS
Qty: 1 EACH | Refills: 4 | Status: SHIPPED | OUTPATIENT
Start: 2024-07-09

## 2024-07-09 RX ORDER — ALBUTEROL SULFATE 90 UG/1
2 AEROSOL, METERED RESPIRATORY (INHALATION) EVERY 6 HOURS PRN
Qty: 2 EACH | Refills: 6 | Status: SHIPPED | OUTPATIENT
Start: 2024-07-09

## 2024-07-09 RX ORDER — BUDESONIDE AND FORMOTEROL FUMARATE DIHYDRATE 160; 4.5 UG/1; UG/1
2 AEROSOL RESPIRATORY (INHALATION) 2 TIMES DAILY
Qty: 10.2 G | Refills: 4 | Status: SHIPPED | OUTPATIENT
Start: 2024-07-09 | End: 2024-07-09

## 2024-07-09 ASSESSMENT — ENCOUNTER SYMPTOMS
COUGH: 1
RHINORRHEA: 1
SHORTNESS OF BREATH: 1
SINUS PAIN: 0
SINUS PRESSURE: 0
WHEEZING: 1
CHEST TIGHTNESS: 1

## 2024-07-09 NOTE — TELEPHONE ENCOUNTER
Patient called stating that the albuterol sulfate and Symbicort sent in today is 200 dollars and he cannot afford this. Please advise thank you

## 2024-07-09 NOTE — TELEPHONE ENCOUNTER
Spoke with patient regarding cost of medications without insurance. Reviewed prices of goodRx and advair diskus 250-50 available for about $45 from [facility]. Patient states this is affordable for him. Medication sent to pharmacy. Patient is aware.

## 2024-07-10 ENCOUNTER — TELEPHONE (OUTPATIENT)
Dept: PHARMACY | Age: 49
End: 2024-07-10

## 2024-07-10 NOTE — TELEPHONE ENCOUNTER
Referral to [facility] received from SARITA Dugan CNP for Smoking Cessation Patient Education/Counseling.      Called pt at this time, pt could not talk right now due to at work and pt states he works every Monday, Wednesday and Friday from 7a- 5p.   He asked if we could call back tomorrow.  I told pt we will try to call back tomorrow or next week on Tuesday or Thursday.  Pt was good with that.

## 2024-07-11 RX ORDER — BUMETANIDE 1 MG/1
1 TABLET ORAL DAILY
Qty: 30 TABLET | Refills: 6 | Status: SHIPPED | OUTPATIENT
Start: 2024-07-11

## 2024-09-16 ENCOUNTER — OFFICE VISIT (OUTPATIENT)
Dept: CARDIOLOGY CLINIC | Age: 49
End: 2024-09-16

## 2024-09-16 VITALS
BODY MASS INDEX: 21.38 KG/M2 | DIASTOLIC BLOOD PRESSURE: 80 MMHG | SYSTOLIC BLOOD PRESSURE: 113 MMHG | WEIGHT: 116.2 LBS | HEIGHT: 62 IN | HEART RATE: 72 BPM

## 2024-09-16 DIAGNOSIS — Z76.89 ENCOUNTER TO ESTABLISH CARE: ICD-10-CM

## 2024-09-16 DIAGNOSIS — I27.81 COR PULMONALE (CHRONIC) (HCC): ICD-10-CM

## 2024-09-16 DIAGNOSIS — R06.02 SHORTNESS OF BREATH: Primary | ICD-10-CM

## 2024-09-16 DIAGNOSIS — F17.200 SMOKER: ICD-10-CM

## 2024-09-16 PROCEDURE — 99213 OFFICE O/P EST LOW 20 MIN: CPT | Performed by: INTERNAL MEDICINE

## 2024-09-24 ENCOUNTER — HOSPITAL ENCOUNTER (OUTPATIENT)
Age: 49
Discharge: HOME | End: 2024-09-26
Attending: INTERNAL MEDICINE

## 2024-09-24 VITALS
SYSTOLIC BLOOD PRESSURE: 113 MMHG | DIASTOLIC BLOOD PRESSURE: 80 MMHG | WEIGHT: 116 LBS | HEIGHT: 62 IN | BODY MASS INDEX: 21.35 KG/M2

## 2024-09-24 DIAGNOSIS — R06.02 SHORTNESS OF BREATH: ICD-10-CM

## 2024-09-24 LAB
ECHO AO ASC DIAM: 2.8 CM
ECHO AO ASCENDING AORTA INDEX: 1.84 CM/M2
ECHO AV CUSP MM: 2 CM
ECHO AV PEAK GRADIENT: 4 MMHG
ECHO AV PEAK VELOCITY: 1 M/S
ECHO AV VELOCITY RATIO: 0.8
ECHO BSA: 1.52 M2
ECHO EST RA PRESSURE: 5 MMHG
ECHO LA AREA 2C: 10.5 CM2
ECHO LA AREA 4C: 8.3 CM2
ECHO LA DIAMETER INDEX: 1.91 CM/M2
ECHO LA DIAMETER: 2.9 CM
ECHO LA MAJOR AXIS: 3.9 CM
ECHO LA MINOR AXIS: 4 CM
ECHO LA VOL BP: 17 ML (ref 18–58)
ECHO LA VOL MOD A2C: 22 ML (ref 18–58)
ECHO LA VOL MOD A4C: 13 ML (ref 18–58)
ECHO LA VOL/BSA BIPLANE: 11 ML/M2 (ref 16–34)
ECHO LA VOLUME INDEX MOD A2C: 14 ML/M2 (ref 16–34)
ECHO LA VOLUME INDEX MOD A4C: 9 ML/M2 (ref 16–34)
ECHO LV E' LATERAL VELOCITY: 6.3 CM/S
ECHO LV E' SEPTAL VELOCITY: 6.5 CM/S
ECHO LV EDV A2C: 61 ML
ECHO LV EDV A4C: 62 ML
ECHO LV EDV INDEX A4C: 41 ML/M2
ECHO LV EDV NDEX A2C: 40 ML/M2
ECHO LV EJECTION FRACTION A2C: 54 %
ECHO LV EJECTION FRACTION A4C: 52 %
ECHO LV EJECTION FRACTION BIPLANE: 53 % (ref 55–100)
ECHO LV ESV A2C: 28 ML
ECHO LV ESV A4C: 30 ML
ECHO LV ESV INDEX A2C: 18 ML/M2
ECHO LV ESV INDEX A4C: 20 ML/M2
ECHO LV FRACTIONAL SHORTENING: 22 % (ref 28–44)
ECHO LV INTERNAL DIMENSION DIASTOLE INDEX: 2.96 CM/M2
ECHO LV INTERNAL DIMENSION DIASTOLIC: 4.5 CM (ref 4.2–5.9)
ECHO LV INTERNAL DIMENSION SYSTOLIC INDEX: 2.3 CM/M2
ECHO LV INTERNAL DIMENSION SYSTOLIC: 3.5 CM
ECHO LV ISOVOLUMETRIC RELAXATION TIME (IVRT): 92 MS
ECHO LV IVSD: 0.8 CM (ref 0.6–1)
ECHO LV MASS 2D: 123.1 G (ref 88–224)
ECHO LV MASS INDEX 2D: 81 G/M2 (ref 49–115)
ECHO LV POSTERIOR WALL DIASTOLIC: 0.9 CM (ref 0.6–1)
ECHO LV RELATIVE WALL THICKNESS RATIO: 0.4
ECHO LVOT PEAK GRADIENT: 3 MMHG
ECHO LVOT PEAK VELOCITY: 0.8 M/S
ECHO MV A VELOCITY: 0.53 M/S
ECHO MV E DECELERATION TIME (DT): 302 MS
ECHO MV E VELOCITY: 0.55 M/S
ECHO MV E/A RATIO: 1.04
ECHO MV E/E' LATERAL: 8.73
ECHO MV E/E' RATIO (AVERAGED): 8.6
ECHO MV E/E' SEPTAL: 8.46
ECHO PV MAX VELOCITY: 0.7 M/S
ECHO PV PEAK GRADIENT: 2 MMHG
ECHO RV GLOBAL SYSTOLIC STRAIN (GLS): -12.6 %
ECHO RV INTERNAL DIMENSION: 3 CM
ECHO RV TAPSE: 1.6 CM (ref 1.7–?)
ECHO TV E WAVE: 0.4 M/S

## 2024-09-24 PROCEDURE — 93356 MYOCRD STRAIN IMG SPCKL TRCK: CPT

## 2024-11-19 ENCOUNTER — OFFICE VISIT (OUTPATIENT)
Dept: PULMONOLOGY | Age: 49
End: 2024-11-19

## 2024-11-19 VITALS
BODY MASS INDEX: 21.71 KG/M2 | HEART RATE: 72 BPM | TEMPERATURE: 98.2 F | WEIGHT: 118 LBS | DIASTOLIC BLOOD PRESSURE: 70 MMHG | SYSTOLIC BLOOD PRESSURE: 114 MMHG | HEIGHT: 62 IN | OXYGEN SATURATION: 92 %

## 2024-11-19 DIAGNOSIS — I50.32 CHRONIC HEART FAILURE WITH PRESERVED EJECTION FRACTION (HCC): ICD-10-CM

## 2024-11-19 DIAGNOSIS — J44.9 STAGE 4 VERY SEVERE COPD BY GOLD CLASSIFICATION (HCC): ICD-10-CM

## 2024-11-19 DIAGNOSIS — Z14.8 ALPHA-1-ANTITRYPSIN DEFICIENCY CARRIER: Primary | ICD-10-CM

## 2024-11-19 DIAGNOSIS — I26.93 SINGLE SUBSEGMENTAL PULMONARY EMBOLISM WITHOUT ACUTE COR PULMONALE (HCC): ICD-10-CM

## 2024-11-19 DIAGNOSIS — Z72.0 TOBACCO ABUSE DISORDER: ICD-10-CM

## 2024-11-19 PROCEDURE — 99406 BEHAV CHNG SMOKING 3-10 MIN: CPT

## 2024-11-19 PROCEDURE — 99214 OFFICE O/P EST MOD 30 MIN: CPT

## 2024-11-19 RX ORDER — ALBUTEROL SULFATE 90 UG/1
2 INHALANT RESPIRATORY (INHALATION) EVERY 6 HOURS PRN
Qty: 2 EACH | Refills: 6 | Status: SHIPPED | OUTPATIENT
Start: 2024-11-19

## 2024-11-19 RX ORDER — FLUTICASONE PROPIONATE AND SALMETEROL 250; 50 UG/1; UG/1
1 POWDER RESPIRATORY (INHALATION) EVERY 12 HOURS
Qty: 1 EACH | Refills: 11 | Status: SHIPPED | OUTPATIENT
Start: 2024-11-19

## 2024-11-19 ASSESSMENT — ENCOUNTER SYMPTOMS
CHEST TIGHTNESS: 0
COUGH: 1
SINUS PAIN: 0
WHEEZING: 1
SHORTNESS OF BREATH: 1
RHINORRHEA: 0
SINUS PRESSURE: 0

## 2024-12-17 ENCOUNTER — OFFICE VISIT (OUTPATIENT)
Dept: CARDIOLOGY CLINIC | Age: 49
End: 2024-12-17

## 2024-12-17 ENCOUNTER — HOSPITAL ENCOUNTER (OUTPATIENT)
Age: 49
Discharge: HOME | End: 2024-12-17

## 2024-12-17 VITALS
BODY MASS INDEX: 22.01 KG/M2 | HEIGHT: 62 IN | SYSTOLIC BLOOD PRESSURE: 116 MMHG | DIASTOLIC BLOOD PRESSURE: 70 MMHG | OXYGEN SATURATION: 97 % | HEART RATE: 78 BPM | WEIGHT: 119.6 LBS

## 2024-12-17 DIAGNOSIS — I50.812 CHRONIC RIGHT-SIDED CHF (CONGESTIVE HEART FAILURE) (HCC): ICD-10-CM

## 2024-12-17 DIAGNOSIS — Z91.89 AT RISK FOR FLUID VOLUME OVERLOAD: ICD-10-CM

## 2024-12-17 DIAGNOSIS — I50.32 CHRONIC DIASTOLIC CONGESTIVE HEART FAILURE, NYHA CLASS 2 (HCC): ICD-10-CM

## 2024-12-17 DIAGNOSIS — I50.32 CHRONIC DIASTOLIC CONGESTIVE HEART FAILURE, NYHA CLASS 2 (HCC): Primary | ICD-10-CM

## 2024-12-17 LAB
ANION GAP SERPL CALC-SCNC: 10 MEQ/L (ref 8–16)
BUN SERPL-MCNC: 10 MG/DL (ref 7–22)
CALCIUM SERPL-MCNC: 10 MG/DL (ref 8.5–10.5)
CHLORIDE SERPL-SCNC: 99 MEQ/L (ref 98–111)
CO2 SERPL-SCNC: 33 MEQ/L (ref 23–33)
CREAT SERPL-MCNC: 0.9 MG/DL (ref 0.4–1.2)
GFR SERPL CREATININE-BSD FRML MDRD: > 90 ML/MIN/1.73M2
GLUCOSE SERPL-MCNC: 99 MG/DL (ref 70–108)
POTASSIUM SERPL-SCNC: 4.8 MEQ/L (ref 3.5–5.2)
SODIUM SERPL-SCNC: 142 MEQ/L (ref 135–145)

## 2024-12-17 PROCEDURE — 36415 COLL VENOUS BLD VENIPUNCTURE: CPT

## 2024-12-17 PROCEDURE — 80048 BASIC METABOLIC PNL TOTAL CA: CPT

## 2024-12-17 PROCEDURE — 99214 OFFICE O/P EST MOD 30 MIN: CPT | Performed by: NURSE PRACTITIONER

## 2024-12-17 ASSESSMENT — ENCOUNTER SYMPTOMS
NAUSEA: 0
COUGH: 0
ABDOMINAL DISTENTION: 0
SHORTNESS OF BREATH: 1
VOMITING: 0

## 2024-12-17 NOTE — PROGRESS NOTES
Conjunctiva/sclera: Conjunctivae normal.   Cardiovascular:      Rate and Rhythm: Normal rate and regular rhythm.      Heart sounds: Normal heart sounds. No murmur heard.  Pulmonary:      Effort: Pulmonary effort is normal. No respiratory distress.      Breath sounds: Wheezing (inspiratory/expiratory) present. No rales.   Abdominal:      General: Bowel sounds are normal. There is no distension.      Palpations: Abdomen is soft.      Tenderness: There is no abdominal tenderness.   Musculoskeletal:         General: Normal range of motion.      Cervical back: Normal range of motion and neck supple.      Right lower leg: No edema.      Left lower leg: No edema.   Skin:     General: Skin is warm and dry.      Capillary Refill: Capillary refill takes less than 2 seconds.   Neurological:      Mental Status: He is alert and oriented to person, place, and time.      Coordination: Coordination normal.   Psychiatric:         Behavior: Behavior normal.         Wt Readings from Last 3 Encounters:   12/17/24 54.3 kg (119 lb 9.6 oz)   11/19/24 53.5 kg (118 lb)   09/24/24 52.6 kg (116 lb)     BP Readings from Last 3 Encounters:   12/17/24 116/70   11/19/24 114/70   09/24/24 113/80     Pulse Readings from Last 3 Encounters:   12/17/24 78   11/19/24 72   09/16/24 72     Body mass index is 21.88 kg/m².    ECHO:     Echo Findings 9/2024    Left Ventricle Mildly reduced left ventricular systolic function with a visually estimated EF of 45 - 50%. Left ventricle size is normal. Normal wall thickness. Mild global hypokinesis present. Indeterminate diastolic function.   Left Atrium Left atrium size is normal.   Right Ventricle Right ventricle is mildly dilated. Mildly reduced systolic function.   Right Atrium Right atrium is mildly dilated.   Aortic Valve Valve structure is normal. No regurgitation. No stenosis.   Mitral Valve Valve structure is normal. No regurgitation. No stenosis noted.   Tricuspid Valve Valve structure is normal. Trace

## 2024-12-17 NOTE — PATIENT INSTRUCTIONS
You may receive a survey regarding the care you received during your visit.  Your input is valuable to us.  We encourage you to complete and return your survey.  We hope you will choose us in the future for your healthcare needs.    Your nurses today were Mihir.  Office hours:   Mon-Thurs 8-4:30  Friday 8-12  Phone: [phone]    Continue:  Continue current medications  Daily weights and record  Fluid restriction of 2 Liters per day  Limit sodium in diet to around 7244-9622 mg/day  Monitor BP  Activity as tolerated     Call the [facility] Failure [facility] for any of the following symptoms:   Weight gain of -3 pounds in 1 day or 5 pounds in 1 week  Increased shortness of breath  Shortness of breath while laying down  Cough  Chest pain  Swelling in feet, ankles or legs  Bloating in abdomen  Fatigue        Repeat blood work today     No med changes today     Continue diet/fluid adherence  Continue daily wts.  F/U w/ Cardiology  F/U in clinic in 6 months

## 2025-02-06 RX ORDER — BUMETANIDE 1 MG/1
1 TABLET ORAL DAILY
Qty: 30 TABLET | Refills: 4 | Status: SHIPPED | OUTPATIENT
Start: 2025-02-06

## 2025-03-19 ENCOUNTER — TELEPHONE (OUTPATIENT)
Dept: CARDIOLOGY CLINIC | Age: 50
End: 2025-03-19

## 2025-03-19 NOTE — TELEPHONE ENCOUNTER
Form received from [employer]- patient assistance application will need to be renewed by 05/14/2025 when his assistance ends.     Called and spoke to patient. He would like to re-enroll  Patient portion mailed to patient today

## 2025-05-21 NOTE — PROGRESS NOTES
[facility]    Patient: ASTON KITCHEN, 49 y.o.   : 1975      Patient of mine    Assessment/Plan      Diagnosis Orders   1. Alpha-1-antitrypsin deficiency carrier        2. Stage 4 very severe COPD by GOLD classification ([facility])  fluticasone-salmeterol (ADVAIR) 250-50 MCG/ACT AEPB diskus inhaler      3. Tobacco abuse disorder        4. Chronic heart failure with preserved ejection fraction (HCC)        5. History of pulmonary embolus (PE)           -Continue Advair as previously ordered, patient still restricted in treatment options related to financial situation and not having any medical insurance.  -Patient not eligible for annual CT lung screenings at this time  -Discussed with patient smoking cessation techniques including patches and gum patient reports has used in the past and did not help, reinforced to patient the importance of quitting smoking to prevent further damage to lung function, patient verbalized understanding. (Approximately 5 mins)   -Continue anticoagulation per cardiology  -Continue Bumex per cardiology/CHF clinic  -Continue to follow your cardiologist. Make sure to weigh yourself daily. If you gain 3 lbs in 1 day or 5 lbs in 1 week, call your cardiologist.      Advised patient to call office with any changes, questions, or concerns regarding respiratory status or issues with prescribed medications    Return in about 6 months (around 2025).     Subjective     Chief Complaint   Patient presents with    Follow-up     6 mo copd f/u no testing        HPI  Aston is here for follow up for very severe COPD, alpha-1 antitrypsin deficiency carrier ([facility]), tobacco use, congestive heart failure, history of PE.     Still smoking about 4-5 cigarettes per day  Has been at this amount for the past month or so  He is only working 2-3 days per week      Overall patient reports respiratory symptoms have been stable since last appointment. Patient

## 2025-05-22 ENCOUNTER — OFFICE VISIT (OUTPATIENT)
Dept: PULMONOLOGY | Age: 50
End: 2025-05-22

## 2025-05-22 VITALS
TEMPERATURE: 98.8 F | HEART RATE: 74 BPM | HEIGHT: 62 IN | BODY MASS INDEX: 21.23 KG/M2 | WEIGHT: 115.4 LBS | OXYGEN SATURATION: 93 % | DIASTOLIC BLOOD PRESSURE: 60 MMHG | SYSTOLIC BLOOD PRESSURE: 102 MMHG

## 2025-05-22 DIAGNOSIS — Z14.8 ALPHA-1-ANTITRYPSIN DEFICIENCY CARRIER: Primary | ICD-10-CM

## 2025-05-22 DIAGNOSIS — J44.9 STAGE 4 VERY SEVERE COPD BY GOLD CLASSIFICATION (HCC): ICD-10-CM

## 2025-05-22 DIAGNOSIS — I50.32 CHRONIC HEART FAILURE WITH PRESERVED EJECTION FRACTION (HCC): ICD-10-CM

## 2025-05-22 DIAGNOSIS — Z86.711 HISTORY OF PULMONARY EMBOLUS (PE): ICD-10-CM

## 2025-05-22 DIAGNOSIS — Z72.0 TOBACCO ABUSE DISORDER: ICD-10-CM

## 2025-05-22 RX ORDER — FLUTICASONE PROPIONATE AND SALMETEROL 250; 50 UG/1; UG/1
1 POWDER RESPIRATORY (INHALATION) EVERY 12 HOURS
Qty: 1 EACH | Refills: 11 | Status: SHIPPED | OUTPATIENT
Start: 2025-05-22

## 2025-05-22 ASSESSMENT — ENCOUNTER SYMPTOMS
RHINORRHEA: 0
SINUS PRESSURE: 0
SINUS PAIN: 0
COUGH: 1
SHORTNESS OF BREATH: 1
WHEEZING: 1
CHEST TIGHTNESS: 0

## 2025-05-29 ENCOUNTER — OFFICE VISIT (OUTPATIENT)
Dept: CARDIOLOGY CLINIC | Age: 50
End: 2025-05-29

## 2025-05-29 VITALS
WEIGHT: 116 LBS | SYSTOLIC BLOOD PRESSURE: 98 MMHG | BODY MASS INDEX: 21.22 KG/M2 | HEART RATE: 88 BPM | DIASTOLIC BLOOD PRESSURE: 72 MMHG | OXYGEN SATURATION: 94 %

## 2025-05-29 DIAGNOSIS — Z91.89 AT RISK FOR FLUID VOLUME OVERLOAD: ICD-10-CM

## 2025-05-29 DIAGNOSIS — I50.812 CHRONIC RIGHT-SIDED CHF (CONGESTIVE HEART FAILURE) (HCC): ICD-10-CM

## 2025-05-29 DIAGNOSIS — I50.32 CHRONIC DIASTOLIC CONGESTIVE HEART FAILURE, NYHA CLASS 2 (HCC): Primary | ICD-10-CM

## 2025-05-29 PROCEDURE — 99214 OFFICE O/P EST MOD 30 MIN: CPT | Performed by: NURSE PRACTITIONER

## 2025-05-29 ASSESSMENT — ENCOUNTER SYMPTOMS
COUGH: 0
SHORTNESS OF BREATH: 1
VOMITING: 0
ABDOMINAL DISTENTION: 0
NAUSEA: 0

## 2025-05-29 NOTE — PROGRESS NOTES
Heart Failure Clinic       Visit Date: 5/29/2025  Cardiologist:  Dr. Negron  Primary Care Physician: Dr. Rivas primary care provider on file.    Alex Martell is a 49 y.o. male who presents today for:  Chief Complaint   Patient presents with    Follow-up    Congestive Heart Failure       HPI:     TYPE HF: HFmrEF 45-50% 9/2024 (50-55% 2024)   Cause: RV failure, Grade 1 DD  Device:   HX: COPD  Dry Wt:  113 on 4/1/2024, 117 on 6/25/24, 119 on 12/17/24, 116 on 5/29/25      Alex Martell is a 49 y.o. male who presents to the office for a f/u  patient visit in the heart failure clinic.    Concerns today: 6 month f/u. Weight stable. No hospitalizations. Urinating well on his Bumex. Continues to smoke - COPD stage 4 following pulmonary.    Activity: ADLs performed w/ [facility] - [facility] with steps and long distances, continues to work 3 days a week.   Diet: Educated today - does not add salt to his food, does look at food labels and limits.     Patient has:  Chest Pain: no  SOB: chronic - at baseline today  Orthopnea/PND: resolved    SCOT: never tested  Edema: resolved  Fatigue: no  Abdominal bloating: some - resolved   Cough: resolved  Appetite: good    Visit on 12/17/24; 6 month f/u. Weight is stable, no hospitalizations. Continues on his oxygen at night. Following pulmonary with recent changes to his inhalers with some improvement of his MCRAE. Repeat ECHO done showing minimal drop in EF by 5%. Continues with RV failure/dilation. Continues on his bumex, urinating well.     Activity: ADLs performed w/ [facility] - [employer] with steps and long distances, continues to work 3 days a week.   Diet: Educated today - does not add salt to his food, does look at food labels and limits.     Patient has:  Chest Pain: no  SOB: chronic - at baseline today  Orthopnea/PND: resolved    SCOT: never tested  Edema: resolved  Fatigue: no  Abdominal bloating: some - resolved   Cough: resolved  Appetite: good    Visit on 6/25/24: here today for his 3 month

## 2025-05-29 NOTE — PROGRESS NOTES
Pt here for 6 mo check up       Pt continue with fatigue , continues with swelling, sob is better     Pt states med list is correct

## 2025-05-29 NOTE — PATIENT INSTRUCTIONS
You may receive a survey regarding the care you received during your visit.  Your input is valuable to us.  We encourage you to complete and return your survey.  We hope you will choose us in the future for your healthcare needs.    Your nurses today were Mihir.  Office hours:   Mon-Thurs 8-4:30  Friday 8-12  Phone: [phone]    Continue:  Continue current medications  Daily weights and record  Fluid restriction of 2 Liters per day  Limit sodium in diet to around 2846-0813 mg/day  Monitor BP  Activity as tolerated     Call the Heart Failure Clinic for any of the following symptoms:   Weight gain of -3 pounds in 1 day or 5 pounds in 1 week  Increased shortness of breath  Shortness of breath while laying down  Cough  Chest pain  Swelling in feet, ankles or legs  Bloating in abdomen  Fatigue        Repeat blood work within the week     No med changes today     Continue diet/fluid adherence  Continue daily wts.  F/U w/ Cardiology  F/U in clinic in 1 year

## 2025-06-05 ENCOUNTER — HOSPITAL ENCOUNTER (OUTPATIENT)
Age: 50
Discharge: HOME | End: 2025-06-05

## 2025-06-05 DIAGNOSIS — I50.32 CHRONIC DIASTOLIC CONGESTIVE HEART FAILURE, NYHA CLASS 2 (HCC): ICD-10-CM

## 2025-06-05 LAB
ANION GAP SERPL CALC-SCNC: 13 MEQ/L (ref 8–16)
BUN SERPL-MCNC: 9 MG/DL (ref 8–23)
CALCIUM SERPL-MCNC: 9.6 MG/DL (ref 8.6–10)
CHLORIDE SERPL-SCNC: 94 MEQ/L (ref 98–111)
CO2 SERPL-SCNC: 29 MEQ/L (ref 22–29)
CREAT SERPL-MCNC: 0.7 MG/DL (ref 0.7–1.2)
GFR SERPL CREATININE-BSD FRML MDRD: > 90 ML/MIN/1.73M2
GLUCOSE SERPL-MCNC: 93 MG/DL (ref 74–109)
MAGNESIUM SERPL-MCNC: 2.2 MG/DL (ref 1.6–2.6)
NT-PROBNP SERPL IA-MCNC: 357 PG/ML (ref 0–124)
POTASSIUM SERPL-SCNC: 4.3 MEQ/L (ref 3.5–5.2)
SODIUM SERPL-SCNC: 136 MEQ/L (ref 135–145)

## 2025-06-05 PROCEDURE — 83880 ASSAY OF NATRIURETIC PEPTIDE: CPT

## 2025-06-05 PROCEDURE — 36415 COLL VENOUS BLD VENIPUNCTURE: CPT

## 2025-06-05 PROCEDURE — 83735 ASSAY OF MAGNESIUM: CPT

## 2025-06-05 PROCEDURE — 80048 BASIC METABOLIC PNL TOTAL CA: CPT

## 2025-06-09 ENCOUNTER — RESULTS FOLLOW-UP (OUTPATIENT)
Dept: CARDIOLOGY CLINIC | Age: 50
End: 2025-06-09

## 2025-07-07 RX ORDER — BUMETANIDE 1 MG/1
1 TABLET ORAL DAILY
Qty: 90 TABLET | Refills: 3 | Status: SHIPPED | OUTPATIENT
Start: 2025-07-07